# Patient Record
Sex: MALE | Race: WHITE | NOT HISPANIC OR LATINO | Employment: FULL TIME | URBAN - NONMETROPOLITAN AREA
[De-identification: names, ages, dates, MRNs, and addresses within clinical notes are randomized per-mention and may not be internally consistent; named-entity substitution may affect disease eponyms.]

---

## 2017-07-07 ENCOUNTER — OFFICE VISIT - GICH (OUTPATIENT)
Dept: FAMILY MEDICINE | Facility: OTHER | Age: 52
End: 2017-07-07

## 2017-07-07 ENCOUNTER — HISTORY (OUTPATIENT)
Dept: FAMILY MEDICINE | Facility: OTHER | Age: 52
End: 2017-07-07

## 2017-07-07 DIAGNOSIS — A69.20 LYME DISEASE: ICD-10-CM

## 2017-07-25 ENCOUNTER — COMMUNICATION - GICH (OUTPATIENT)
Dept: INTERNAL MEDICINE | Facility: OTHER | Age: 52
End: 2017-07-25

## 2017-07-28 ENCOUNTER — HISTORY (OUTPATIENT)
Dept: INTERNAL MEDICINE | Facility: OTHER | Age: 52
End: 2017-07-28

## 2017-07-28 ENCOUNTER — OFFICE VISIT - GICH (OUTPATIENT)
Dept: INTERNAL MEDICINE | Facility: OTHER | Age: 52
End: 2017-07-28

## 2017-07-28 DIAGNOSIS — R53.81 OTHER MALAISE: ICD-10-CM

## 2017-07-28 DIAGNOSIS — R53.83 OTHER FATIGUE: ICD-10-CM

## 2017-07-28 DIAGNOSIS — F41.8 OTHER SPECIFIED ANXIETY DISORDERS: ICD-10-CM

## 2017-07-28 ASSESSMENT — ANXIETY QUESTIONNAIRES
1. FEELING NERVOUS, ANXIOUS, OR ON EDGE: NOT AT ALL
3. WORRYING TOO MUCH ABOUT DIFFERENT THINGS: NOT AT ALL
6. BECOMING EASILY ANNOYED OR IRRITABLE: NOT AT ALL
2. NOT BEING ABLE TO STOP OR CONTROL WORRYING: NOT AT ALL
4. TROUBLE RELAXING: NOT AT ALL
7. FEELING AFRAID AS IF SOMETHING AWFUL MIGHT HAPPEN: NOT AT ALL
5. BEING SO RESTLESS THAT IT IS HARD TO SIT STILL: NOT AT ALL
GAD7 TOTAL SCORE: 0

## 2017-08-03 ENCOUNTER — COMMUNICATION - GICH (OUTPATIENT)
Dept: INTERNAL MEDICINE | Facility: OTHER | Age: 52
End: 2017-08-03

## 2017-08-03 DIAGNOSIS — F41.8 OTHER SPECIFIED ANXIETY DISORDERS: ICD-10-CM

## 2017-09-11 ENCOUNTER — COMMUNICATION - GICH (OUTPATIENT)
Dept: INTERNAL MEDICINE | Facility: OTHER | Age: 52
End: 2017-09-11

## 2017-12-28 NOTE — TELEPHONE ENCOUNTER
Patient Information     Patient Name MRN Zachariah Townsend [id number] Male 1965      Telephone Encounter by Shanika Larsen at 2017  8:56 AM     Author:  Shanika Larsen Service:  (none) Author Type:  (none)     Filed:  2017  8:57 AM Encounter Date:  2017 Status:  Signed     :  Shanika Larsen            Patient did not receive 37.5 mg Effexor ER, verbal order provided to allan.    Shanika Larsen LPN        2017 8:57 AM

## 2017-12-28 NOTE — TELEPHONE ENCOUNTER
Patient Information     Patient Name MRN Zachariah Townsend [id number] Male 1965      Telephone Encounter by Shanika Larsen at 2017 10:04 AM     Author:  Shanika Larsen Service:  (none) Author Type:  (none)     Filed:  2017 10:05 AM Encounter Date:  2017 Status:  Signed     :  Shanika Larsen            Patient placed with Dominic Galo MD on 17, around 1040.    Shanika Larsen LPN        2017 10:05 AM

## 2017-12-28 NOTE — TELEPHONE ENCOUNTER
Patient Information     Patient Name MRZachariah Allen [id number] Male 1965      Telephone Encounter by Anne Sprague RN at 8/3/2017  4:14 PM     Author:  Anne Sprague RN Service:  (none) Author Type:  NURS- Registered Nurse     Filed:  8/3/2017  4:15 PM Encounter Date:  8/3/2017 Status:  Signed     :  Anne Sprague RN (NURS- Registered Nurse)            Depression-in adults 18 and over  Serotonin/Norepinephrine Reuptake Inhibitors    Office visit in the past 12 months or as indicated in chart.  Should have clinic visit 1-2 months after initial prescription.    Last visit with RAFA KAY was on: 2017 in [facility]  Next visit with RAFA KAY is on: No future appointment listed with this provider  Next visit with Internal Medicine is on: No future appointment listed in this department  Appears RX from  was printed; rx now being sent.   Max refills 12 months from last office visit or per providers notes.  Prescription refilled per RN Medication Refill Policy.................... ANNE SPRAGUE RN ....................  8/3/2017   4:14 PM

## 2017-12-28 NOTE — TELEPHONE ENCOUNTER
Patient Information     Patient Name MRN Zachariah Townsend [id number] Male 1965      Telephone Encounter by Dominic Galo MD at 2017  5:55 PM     Author:  Dominic Galo MD Service:  (none) Author Type:  Physician     Filed:  2017  5:56 PM Encounter Date:  2017 Status:  Signed     :  Dominic Galo MD (Physician)            Noted.     It did not work to see patient on Tuesday, 2017.  -- See if patient is available for some time on Friday, 2017 -- there is a potential spot midmorning or toward the end of the day.    Dominic Galo MD

## 2017-12-28 NOTE — PROGRESS NOTES
Patient Information     Patient Name MRZachariah Allen [id number] Male 1965      Progress Notes by Dominic Galo MD at 2017 10:40 AM     Author:  Dominic Galo MD Service:  (none) Author Type:  Physician     Filed:  2017  7:35 PM Encounter Date:  2017 Status:  Signed     :  Dominic Galo MD (Physician)            Nursing Notes:   Shanika Larsen  2017 10:59 AM  Signed  Patient presents to the clinic for medication follow up.    Shanika JUAN Larsen        2017 10:48 AM    Zachariah Stout presents to clinic today for:   Chief Complaint    Patient presents with      Follow Up     HPI: Mr. Stout is a 51 y.o. male who presents today for evaluation of above.     (F41.8) Anxiety associated with depression  (primary encounter diagnosis)  (R53.81,  R53.83) Malaise and fatigue     Patient presents for follow-up of anxiety and depression, fatigue and malaise.  Was taking low-dose Effexor for quite a while did really well with this.  In the slowly tapering down 1 tablet every other day and then every third day than in the up stopping it.  States that he went back on it not too long ago because he was having more anxiety issues and it really isn't working as well as before.  He is wondering about increasing the dose versus changing medications.  Says it worked so well advised we try a dose increase he still has quite a few of the 37-1/2 mg capsules at home advised patient if the 75 mg capsule and the doing well for him for a couple months ago was try reducing the dose in the future.      With his fatigue and malaise, low previously checked a number of labs.  His B12 level came back in in the low-normal range previously.  He did try some oral B12 for a while but is not sure did a whole lot.    He also tried some vitamin D supplements, patient is not sure vitamin D tablets did much for him.  So he stopped taking them.    Treatment options and potential deficiencies reviewed.   Advised trial of B12 shot.  We talked about risk and benefits he would like to proceed.    Mr. Stout's Body mass index is 25.92 kg/(m^2). This is out of the normal range for a 51 y.o. Normal range for ages 18+ is between 18.5 and 24.9. To lose weight we reviewed risks and benefits of appropriate options such as diet, exercise, and medications. Patient's strategy will be  self-directed nutrition plan and self-directed exercise program   BP Readings from Last 1 Encounters:07/28/17 : 110/64  Mr. Hanks blood pressure is within the normal range for adults. Per JNC-8 guidelines normal adult blood pressure is < 120/80, pre-hypertensive is between 120/80 and 139/89, and hypertension is 140/90 or greater.    Functional Capacity: > 4 METS.   Reports that he can climb a flight of stairs without any chest pain/heaviness or shortness of breath.   Patient reports no current symptoms of fevers, chills, nausea/vomiting.   No cough. No shortness of breath.   No change in bowel/bladder habits. No melena, hematochezia. No Hematuria.   No rashes. No palpitations.  No orthopnea/paroxysmal nocturnal dyspnea   No vision or hearing issues.   No significant mood issues -- more anxiety again, low-dose effexor isn't working as well.   No bruising.     CHOLO:  CHOLO-7 ANXIETY SCREENING 7/28/2017   CHOLO date (doc flow) 7/28/2017   Nervous, anxious 0   Cannot stop worrying 0   Worry about different things 0   Cannot relax 0   Feeling restless 0   Easily annoyed/irritated 0   Afraid of awful event 0   Score 0   Severity none   Some recent data might be hidden        PHQ9:  PHQ Depression Screening 10/24/2016 7/28/2017   Date of PHQ exam (doc flow) 10/24/2016 7/28/2017   1. Lack of interest/pleasure 0 - Not at all 0 - Not at all   2. Feeling down/depressed 0 - Not at all 0 - Not at all   PHQ-2 TOTAL SCORE 0 0   3. Trouble sleeping 0 - Not at all 0 - Not at all   4. Decreased energy 3 - Nearly every day 0 - Not at all   5. Appetite change 0 - Not at  all 0 - Not at all   6. Feelings of failure 0 - Not at all 0 - Not at all   7. Trouble concentrating 0 - Not at all 0 - Not at all   8. Activity level 0 - Not at all 0 - Not at all   9. Hurting yourself 0 - Not at all 0 - Not at all   PHQ-9 TOTAL SCORE 3 0   PHQ-9 Severity Level none none   Functional Impairment somewhat difficult not difficult at all   Some recent data might be hidden          I have personally reviewed the past medical history, past surgical history, medications, allergies, family and social history as listed below, on 2017.    Patient Active Problem List      Diagnosis Date Noted     Mixed hyperlipidemia 2016     Malaise and fatigue 2016     Healed perforation of right ear drum 2015     Bradycardia 2015     Anxiety associated with depression 2013     Past Medical History:     Diagnosis  Date     Health care maintenance      Past Surgical History:      Procedure  Laterality Date     NO PREVIOUS SURGERY       Current Outpatient Prescriptions       Medication  Sig Dispense Refill     venlafaxine (EFFEXOR XR) 75 mg cp24 Extended-Release capsule TAKE ONE CAPSULE BY MOUTH EVERY DAY WITH MEAL 90 capsule 3     venlafaxine (EFFEXOR XR) 75 mg cp24 Extended-Release capsule Take 1 capsule by mouth once daily with a meal. - dose increase 2017 30 capsule 0     Allergies      Allergen   Reactions     Lexapro [Escitalopram]  Diaphoresis     Fatigue/malaise      No family history on file.  Family Status     Relation  Status     Father      Mother Alive    Alcohol abuse.       Social History     Social History        Marital status:  Single     Spouse name: Kate     Number of children:  2     Years of education:  N/A     Occupational History       [employer]     Social History Main Topics       Smoking status: Never Smoker     Smokeless tobacco: Never Used     Alcohol use No     Drug use: No     Sexual activity: Not on file     Other  "Topics  Concern     Not on file      Social History Narrative      - Wife Kate.     2 children - 1 son, 1 daughter.     Works for [employer].    Was an only child.          Pertinent ROS was performed and was negative as noted in HPI above.     EXAM:   Vitals:     07/28/17 1049   BP: 110/64   Pulse: 56   Weight: 84 kg (185 lb 2 oz)     BP Readings from Last 3 Encounters:    07/28/17 110/64   07/07/17 140/78   10/24/16 106/60     Wt Readings from Last 3 Encounters:    07/28/17 84 kg (185 lb 2 oz)   07/07/17 84.9 kg (187 lb 3.2 oz)   10/24/16 85.7 kg (189 lb)     Estimated body mass index is 25.92 kg/(m^2) as calculated from the following:    Height as of 7/7/17: 1.8 m (5' 10.87\").    Weight as of this encounter: 84 kg (185 lb 2 oz).     EXAM:  Constitutional: Pleasant, alert, appropriate appearance for age. No acute distress  Lymphatic Exam: Non-palpable nodes in neck, clavicular regions  Pulmonary: Lungs are clear to auscultation bilaterally, without wheezes or crackles  Cardiovascular Exam: regular rate and rhythm, no pedal edema  Gastrointestinal Exam: Soft, non-tender, non-distended, positive bowel sounds  Integument: No abnormal rashes, sores, or ulcerations noted  Neurologic Exam: CN 3-12 grossly intact   Musculoskeletal Exam: Moves upper and lower extremities symmetrically, No focal weakness  Gait and station appear grossly normal  Psychiatric Exam: Awake and Alert, Affect and mood appropriate  Speech is fluent, Thought process is normal    INVESTIGATIONS:  Results for orders placed or performed in visit on 07/05/16      COMPLETE METABOLIC PANEL      Result  Value Ref Range    SODIUM 137 132 - 142 mmol/L    POTASSIUM 4.0 3.5 - 5.1 mmol/L    CHLORIDE 104 98 - 107 mmol/L    CO2,TOTAL 28 21 - 31 mmol/L    ANION GAP 5 5 - 18                    GLUCOSE 95 70 - 105 mg/dL    CALCIUM 9.6 8.6 - 10.3 mg/dL    BUN 24 7 - 25 mg/dL    CREATININE 1.11 0.70 - 1.30 mg/dL    BUN/CREAT RATIO           22     "                GFR if African American >60 >60 ml/min/1.73m2    GFR if not African American >60 >60 ml/min/1.73m2    ALBUMIN 4.7 3.5 - 5.7 g/dL    PROTEIN,TOTAL 7.5 6.4 - 8.9 g/dL    GLOBULIN                  2.8 2.0 - 3.7 g/dL    A/G RATIO 1.7 1.0 - 2.0                    BILIRUBIN,TOTAL 0.4 0.3 - 1.0 mg/dL    ALK PHOSPHATASE 39 34 - 104 IU/L    ALT (SGPT) 15 7 - 52 IU/L    AST (SGOT) 13 13 - 39 IU/L   LIPID PANEL      Result  Value Ref Range    CHOLESTEROL,TOTAL 261 (H) <200 mg/dL    TRIGLYCERIDES 174 (H) <150 mg/dL    HDL CHOLESTEROL 51 23 - 92 mg/dL    NON-HDL CHOLESTEROL 210 (H) <145 mg/dl    CHOL/HDL RATIO            5.12 (H) <4.50                    LDL CHOLESTEROL 175 (H) <100 mg/dL    PATIENT STATUS            NOT GIVEN                   TSH      Result  Value Ref Range    TSH 3.44 0.34 - 5.60 uIU/mL   VITAMIN B12      Result  Value Ref Range    VITAMIN B12 474 180 - 914 pg/mL   VITAMIN D 25 (DEFICIENCY)      Result  Value Ref Range    VITAMIN D TOTAL AFL 27.9   ng/mL   CBC WITH AUTO DIFFERENTIAL      Result  Value Ref Range    WHITE BLOOD COUNT         7.1 4.5 - 11.0 thou/cu mm    RED BLOOD COUNT           4.71 4.30 - 5.90 mil/cu mm    HEMOGLOBIN                14.9 13.5 - 17.5 g/dL    HEMATOCRIT                42.9 37.0 - 53.0 %    MCV                       91 80 - 100 fL    MCH                       31.6 26.0 - 34.0 pg    MCHC                      34.7 32.0 - 36.0 g/dL    RDW                       10.9 (L) 11.5 - 15.5 %    PLATELET COUNT            255 140 - 440 thou/cu mm    MPV                       7.4 6.5 - 11.0 fL    NEUTROPHILS               54.9 42.0 - 72.0 %    LYMPHOCYTES               33.4 20.0 - 44.0 %    MONOCYTES                 8.8 <12.0 %    EOSINOPHILS               1.6 <8.0 %    BASOPHILS                 1.2 <3.0 %    ABSOLUTE NEUTROPHILS      3.9 1.7 - 7.0 thou/cu mm    ABSOLUTE LYMPHOCYTES      2.4 0.9 - 2.9 thou/cu mm    ABSOLUTE MONOCYTES        0.6 <0.9 thou/cu mm    ABSOLUTE  EOSINOPHILS      0.1 <0.5 thou/cu mm    ABSOLUTE BASOPHILS        0.1 <0.3 thou/cu mm       ASSESSMENT AND PLAN:  Zachariah was seen today for follow up.    Diagnoses and all orders for this visit:    Anxiety associated with depression  -     venlafaxine (EFFEXOR XR) 75 mg cp24 Extended-Release capsule; TAKE ONE CAPSULE BY MOUTH EVERY DAY WITH MEAL  -     venlafaxine (EFFEXOR XR) 75 mg cp24 Extended-Release capsule; Take 1 capsule by mouth once daily with a meal. - dose increase 7/28/2017    Malaise and fatigue  -     cyanocobalamin 1,000 mcg injection (VITAMIN B12); Inject 1 mL intramuscular one time.    lab results and schedule of future lab studies reviewed with patient, reviewed diet, exercise and weight control, recommended sodium restriction    -- Expected clinical course discussed   -- Medications and their side effects discussed    The 10-year ASCVD risk score (Guzman LEIJA Jr, et al., 2013) is: 3.5%    Values used to calculate the score:      Age: 51 years      Sex: Male      Is Non- : No      Diabetic: No      Tobacco smoker: No      Systolic Blood Pressure: 110 mmHg      Is BP treated: No      HDL Cholesterol: 51 mg/dL      Total Cholesterol: 261 mg/dL    Zachariah is also recommended to eat a heart-healthy diet, do regular aerobic exercises, maintain a desirable body weight, and avoid tobacco products. These recommendations are from the American Heart Association (AHA) which stresses the importance of lifestyle changes to lower cardiovascular disease risk.     Return in about 1 year (around 7/28/2018) for -- annual physical.    Patient Instructions   1. Anxiety associated with depression    Dose increase Effexor....    - venlafaxine (EFFEXOR XR) 75 mg cp24 Extended-Release capsule; TAKE ONE CAPSULE BY MOUTH EVERY DAY WITH MEAL  Dispense: 90 capsule; Refill: 3  - venlafaxine (EFFEXOR XR) 75 mg cp24 Extended-Release capsule; Take 1 capsule by mouth once daily with a meal. - dose increase 7/28/2017   Dispense: 30 capsule; Refill: 0    2. Malaise and fatigue    - cyanocobalamin 1,000 mcg injection (VITAMIN B12); Inject 1 mL intramuscular one time.    Vitamin B12 deficiency:    Vitamin B12 deficiency can cause numerous symptoms.  Fatigue and malaise, low energy levels, low blood counts or anemia, poor mood or depression, neuropathy or pins and needles/burning sensation of the hands and feet.    -- trial of B12 shot today.    -- If B12 shot improves your energy and your blood counts, we can do B12 shots every 3-4 weeks up to every 2 or 3 months if needed.    -- Omeprazole (Proton Pump Inhibitors in general) and metformin can lower B12 levels (inhibits absorption).    -- Consider B12 tablet or B-complex tablet -- once daily.     -- Some people are able to take and absorb oral B12 or B complex tablets.   -- Some people are NOT able to absorb oral B12 very well.    If you decide to proceed with oral B12 replacement, but your B12 levels do not improve, you likely will need to use B12 shots instead.    Return in approximately 1 year, or sooner as needed for follow-up with Dr. Galo.  -- annual physical    Clinic : [phone]  Appointment line: [phone]      Dominic Galo MD

## 2017-12-28 NOTE — PATIENT INSTRUCTIONS
Patient Information     Patient Name MRZachariah Allen [id number] Male 1965      Patient Instructions by Zarina Gonzales NP at 2017  2:45 PM     Author:  Zarina Gonzales NP Service:  (none) Author Type:  PHYS- Nurse Practitioner     Filed:  2017  2:40 PM Encounter Date:  2017 Status:  Signed     :  Zarina Gonzales NP (PHYS- Nurse Practitioner)            Lyme Disease   ________________________________________________________________________  KEY POINTS    Lyme disease is an infection caused by the bite of a blacklegged tick (also called deer tick) that is infected with the bacteria.    Lyme disease is treated with antibiotics. In most cases, the symptoms go away a few weeks or months after antibiotic treatment, but sometimes the symptoms last several years.    Follow the full course of treatment prescribed by your healthcare provider. Do not stop taking antibiotics because you start to feel better or your symptoms go away.    Wear long-sleeved shirts and long pants and use insect repellant to avoid ticks. If you find a tick attached to your body, you need to remove it.  ________________________________________________________________________  What is Lyme disease?  Lyme disease is an infection caused by the bite of a blacklegged tick (also called deer tick) infected with bacteria. The tick is so small that you may not notice the tick or its bite. Most deer ticks do not carry Lyme disease. Even if a tick is infected, it may not transfer the disease to you. An infected tick that is attached for less than 36 hours is less likely to cause Lyme disease.  If the disease is not diagnosed and treated, the symptoms can last for several years, but they usually get better over time.  What is the cause?  Ticks are found in woods, forests, grasslands, and marshlands and at the seashore. Wild birds and animals, as well as domestic animals and pets such as dogs, horses, and cows, can  carry ticks. Ticks may climb on humans from animals, leaves, or low-lying brush. Ticks cannot jump or fly.  People usually become infected during the summer when they are more likely to be exposed to ticks. Hikers, campers, hunters, and people living in wooded or rural areas have a higher risk for Lyme disease. In the , the infection is more common in the northern states.  What are the symptoms?  Lyme disease is hard to diagnose because its symptoms can vary greatly from person to person. The first symptoms may not even be noticed. Symptoms may come and go in cycles lasting a week or longer.  Untreated Lyme disease may progress through these 3 stages:  Stage 1:  In the first month after a bite by an infected tick, you may get a rash at the site of your bite. The rash begins as a large red spot that may be flat or bumpy. The area of the rash feels warm, but it is not painful or itchy. The rash slowly spreads and the red color at the center of the rash may fade, creating what is called a bull's-eye rash. Sometimes the rash may blister or scab in the center. The thigh, groin, and armpit are common sites for the rash, but it can appear anywhere.  Although most infected people develop a rash, you may not have this symptom, or you may overlook it.  You may feel like you have the flu, with symptoms such as:    Feeling very tired or drowsy    Pain or stiffness in muscles and joints    Headache or jaw pain    Chills and fever    Stiff neck  Less common symptoms of early Lyme disease are:    Red, irritated eyes    Sore throat and cough    In men, swelling of the testicles  Even if you don't get treatment, the early symptoms usually improve or go away within several weeks. However, you may feel tired, drowsy, and have muscle or joint pain for months after the rash has gone.  Stage 2:  If not treated, Lyme disease can spread to your brain, heart, and joints. Several weeks to months after the first symptoms appear, you may  develop:    Weakness or paralysis of one or both sides of your face    Fever, headache, and a stiff neck    Heart problems, such as an irregular heartbeat    Confusion    Seizures    Coma  During this second stage, you may have pain in your joints, tendons, muscles, or bones, usually without joint swelling. These symptoms usually go away within a few weeks.  Stage 3:  After several months of infection, you may have:    Joint pain and swelling    Numbness or tingling in your hands and feet    Trouble concentrating    Weakness in your arms or legs    Depression  How is it diagnosed?  Your healthcare provider will ask about your symptoms and medical history and examine you. You may have a blood test for Lyme disease. Or you and your provider may decide to start treatment without the test or before the test results are available.  If you were recently bitten by a tick, saving the tick in a marked plastic bag in your freezer may help your provider diagnose your symptoms and decide on treatment.  How is it treated?  Lyme disease is treated with antibiotics. In most cases, the symptoms go away a few weeks or months after antibiotic treatment, but sometimes the symptoms last several years.   If you are in stages 2 or 3 of the disease, you may need other treatments if you have symptoms that affect your heart, nervous system, or joints.  If you are pregnant or nursing and have Lyme disease, you may pass the disease to your baby. Although this happens rarely, you should call your healthcare provider right away if you are pregnant or nursing and are bitten by a tick or have symptoms of Lyme disease.  How can I take care of myself?  Follow the full course of treatment prescribed by your healthcare provider. You need to take all of your antibiotic medicine. Do not stop taking antibiotics because you start to feel better or your symptoms go away. Ask your healthcare provider:    How long it will take to recover    If there are  activities you should avoid and when you can return to your normal activities    How to take care of yourself at home    What symptoms or problems you should watch for and what to do if you have them  Make sure you know when you should come back for a checkup.  How can I help prevent Lyme disease?    In areas of thick underbrush, try to stay near the center of trails.    When you are outdoors, wear long-sleeved shirts tucked into your pants. Wear your pants tucked into your socks or boot tops if possible. A hat may help, too. Wearing light-colored clothing may make it easier to spot a small tick before it reaches your skin and bites. While you are outside, check for ticks every 4 hours and remove any ticks on clothing or exposed skin.    Use approved tick repellents on exposed skin and clothing. Don t use more than recommended in the package directions. Do not put repellent on open wounds or rashes. When using sprays, don t spray the repellent directly on your face. Spray the repellent on your hands first and then put it on your face, but not near your eyes or mouth. Then wash the spray off your hands.    Adults should use repellent products with no more than 35% DEET. Children older than 2 months can use repellents with no more than 30% DEET. Don t put DEET on a child s hand or other body part they are likely to put in their mouth. DEET should be applied just once a day. Wash it off your body when you go back indoors. Some products contain more than 35% DEET. The higher concentrations are no more effective than the lower concentrations, but they may last longer. Read the label carefully before applying.    Picaridin may irritate the skin less than DEET and appears to be just as effective.    Spray clothes with repellents because ticks may crawl from clothing to the skin. Products containing permethrin are recommended for use on clothing, shoes, bed nets, and camping gear. Permethrin-treated clothing repels and  kills ticks, mosquitoes, and other insects and can keep working after laundering. Permethrin should be reapplied to clothing according to the instructions on the product label. You can buy clothing and hats pretreated with permethrin. Permethrin does not work as a repellent when it is put on the skin.    Treat household pets for ticks and fleas. Check pets after they've been outdoors.    Brush off clothing and pets before entering the house.    After you have been outdoors, undress and check your body for ticks. They usually crawl around for several hours before biting. Check your clothes, too. Wash them right away to remove any ticks.    If you find a tick attached to your body, you need to remove it.    Grasp the tick with tweezers or fingers (covered with gloves or a tissue) as close to the skin as possible. Gently pull the tick straight away from you until it releases its hold. Use a slow gentle pulling motion. Pulling the tick out too quickly may tear the body from the mouth, leaving the mouth still in the skin. If you are unable to remove the tick completely, you may need to see your healthcare provider. Do not twist the tick as you pull, and try not to squeeze its body.    After you have removed the tick, thoroughly wash your hands and the bite area with soap and water. Put an antiseptic such as rubbing alcohol on the area where you were bitten.    Put the tick in a sealed plastic bag and keep it in the freezer. Identification of the tick may help your provider diagnose and treat any symptoms. If you do not have any symptoms of disease after 1 month, you can throw away the tick.    Shower and shampoo after your outing.    Inspect any gear you have carried outdoors.    If you spend much time hiking, you may want to include a pair of tick tweezers in your first-aid kit. You can buy them at sporting goods stores.    If you had a shot against Lyme disease (shots were available until 2002) you are probably no  longer protected because the vaccine loses its effect over time. A new vaccine may be approved by the FDA in 2015. There is no vaccine available for Lyme disease for humans at this time.

## 2017-12-28 NOTE — TELEPHONE ENCOUNTER
Patient Information     Patient Name MRZachariah Allen [id number] Male 1965      Telephone Encounter by Aurora Dhaliwal at 2017 10:10 AM     Author:  Aurora Dhaliwal Service:  (none) Author Type:  (none)     Filed:  2017 10:14 AM Encounter Date:  2017 Status:  Signed     :  Aurora Dhaliwal            Patient would like to increase the dosage of his venlafaxine. He states in the last month or two it has not been working like it did before. He has not been in for a follow up on this medication since 10/24/16.  Aurora SALGADO, RAEANN.......2017..10:13 AM

## 2017-12-28 NOTE — TELEPHONE ENCOUNTER
Patient Information     Patient Name MRN Zachariah Townsend [id number] Male 1965      Telephone Encounter by Aurora Dhaliwal at 2017  3:49 PM     Author:  Aurora Dhaliwal Service:  (none) Author Type:  (none)     Filed:  2017  3:50 PM Encounter Date:  2017 Status:  Signed     :  Aurora Dhaliwal            Left message to call back as Dr. Galo just said he can be seen right now if he can make it to the clinic as soon as possible.  Aurora Dhaliwal...2017..3:49 PM

## 2017-12-28 NOTE — PATIENT INSTRUCTIONS
Patient Information     Patient Name MRZachariah Allen [id number] Male 1965      Patient Instructions by Dominic Galo MD at 2017 10:40 AM     Author:  Dominic Galo MD  Service:  (none) Author Type:  Physician     Filed:  2017 11:07 AM  Encounter Date:  2017 Status:  Addendum     :  Dominic Galo MD (Physician)        Related Notes: Original Note by Dominic Galo MD (Physician) filed at 2017 11:07 AM            1. Anxiety associated with depression    Dose increase Effexor....    - venlafaxine (EFFEXOR XR) 75 mg cp24 Extended-Release capsule; TAKE ONE CAPSULE BY MOUTH EVERY DAY WITH MEAL  Dispense: 90 capsule; Refill: 3  - venlafaxine (EFFEXOR XR) 75 mg cp24 Extended-Release capsule; Take 1 capsule by mouth once daily with a meal. - dose increase 2017  Dispense: 30 capsule; Refill: 0    2. Malaise and fatigue    - cyanocobalamin 1,000 mcg injection (VITAMIN B12); Inject 1 mL intramuscular one time.    Vitamin B12 deficiency:    Vitamin B12 deficiency can cause numerous symptoms.  Fatigue and malaise, low energy levels, low blood counts or anemia, poor mood or depression, neuropathy or pins and needles/burning sensation of the hands and feet.    -- trial of B12 shot today.    -- If B12 shot improves your energy and your blood counts, we can do B12 shots every 3-4 weeks up to every 2 or 3 months if needed.    -- Omeprazole (Proton Pump Inhibitors in general) and metformin can lower B12 levels (inhibits absorption).    -- Consider B12 tablet or B-complex tablet -- once daily.     -- Some people are able to take and absorb oral B12 or B complex tablets.   -- Some people are NOT able to absorb oral B12 very well.    If you decide to proceed with oral B12 replacement, but your B12 levels do not improve, you likely will need to use B12 shots instead.    Return in approximately 1 year, or sooner as needed for follow-up with Dr. Galo.  -- annual  physical    Clinic : [phone]  Appointment line: [phone]

## 2017-12-28 NOTE — PROGRESS NOTES
"Patient Information     Patient Name MRN Sex Zachariah Real [id number] Male 1965      Progress Notes by Zarina Gonzales NP at 2017  2:45 PM     Author:  Zarina Gonzales NP Service:  (none) Author Type:  PHYS- Nurse Practitioner     Filed:  2017  8:31 PM Encounter Date:  2017 Status:  Signed     :  Zarina Gonzales NP (PHYS- Nurse Practitioner)            Nursing Notes:   Trinidad Macario  2017  2:37 PM  Signed  Patient presents with bug bite 1 week ago. Patient states rash on right abdomen and it is growing. Patient states it itches and burns. No fevers present. Trinidad Macario LPN .............2017  2:29 PM    SUBJECTIVE:    Zachariah Stout is a 51 y.o. male who presents for rash on abd.     HPI  Zachariah Stout is here with a rash on the abd. Noted it a week ago and now has grown. Mild irritation and itchiness. No known tick bites, but did feel it started with a bug bite. No fevers, chills, no body aches. Has not used OTC on it.     Current Outpatient Prescriptions on File Prior to Visit       Medication  Sig Dispense Refill     venlafaxine (EFFEXOR XR) 37.5 mg Extended-Release capsule TAKE ONE CAPSULE BY MOUTH EVERY DAY WITH MEAL 90 capsule 1     No current facility-administered medications on file prior to visit.        REVIEW OF SYSTEMS:  Review of Systems   Constitutional: Negative.    HENT: Negative.    Eyes: Negative.    Respiratory: Negative.    Cardiovascular: Negative.    Gastrointestinal: Negative.    Genitourinary: Negative.    Skin: Positive for rash.       OBJECTIVE:  /78  Pulse 62  Temp 98  F (36.7  C) (Tympanic)  Ht 1.8 m (5' 10.87\")  Wt 84.9 kg (187 lb 3.2 oz)  BMI 26.21 kg/m2    EXAM:   Physical Exam   Constitutional: He is well-developed, well-nourished, and in no distress.   HENT:   Head: Normocephalic and atraumatic.   Eyes: Conjunctivae are normal.   Neck: Neck supple.   Cardiovascular: Normal rate.    Pulmonary/Chest: Effort normal. " No respiratory distress.   Lymphadenopathy:     He has no cervical adenopathy.   Skin: Skin is warm and dry.   Skin: rash location: RT side of abdomen  rash description: features of Lyme's disease: central papule surrounded by annual erythema that measures 10+ cm of which the advancing border is slightly raised, warm, red  Complications include none.   Psychiatric: Mood and affect normal.   Nursing note and vitals reviewed.      ASSESSMENT/PLAN:    ICD-10-CM    1. Erythema migrans (Lyme disease) A69.20 doxycycline (VIBRAMYCIN) 100 mg capsule        Plan:  Highly suspicious of EM. He does not remember removing a tick, however knows it started with a red papule/bug bite. F/U if needed Doxy rx sent in. I explained my diagnostic considerations and recommendations to the patient, who voiced understanding and agreement with the treatment plan. All questions were answered. We discussed potential side effects of any prescribed or recommended therapies, as well as expectations for response to treatments. He was advised to contact our office if there is no improvement or worsening of conditions or symptoms.  If s/s worsen or persist, patient will either come back or follow up with PCP.       MAGAN ANDERSEN NP ....................  7/7/2017   8:30 PM

## 2017-12-30 NOTE — NURSING NOTE
Patient Information     Patient Name MRZachariah Allen [id number] Male 1965      Nursing Note by Shanika Larsen at 2017 10:40 AM     Author:  Shanika Larsen Service:  (none) Author Type:  (none)     Filed:  2017 10:59 AM Encounter Date:  2017 Status:  Signed     :  Shanika Larsen            Patient presents to the clinic for medication follow up.    Shanika Larsen LPN        2017 10:48 AM

## 2017-12-30 NOTE — NURSING NOTE
Patient Information     Patient Name MRN Zachariah Townsend [id number] Male 1965      Nursing Note by Trinidad Macario at 2017  2:45 PM     Author:  Trinidad Macario Service:  (none) Author Type:  NURS- Student Practical Nurse     Filed:  2017  2:37 PM Encounter Date:  2017 Status:  Signed     :  Trinidad Macario (NURS- Student Practical Nurse)            Patient presents with bug bite 1 week ago. Patient states rash on right abdomen and it is growing. Patient states it itches and burns. No fevers present. Trinidad Macario LPN .............2017  2:29 PM

## 2018-01-26 VITALS
BODY MASS INDEX: 25.92 KG/M2 | DIASTOLIC BLOOD PRESSURE: 64 MMHG | SYSTOLIC BLOOD PRESSURE: 110 MMHG | WEIGHT: 185.13 LBS | HEART RATE: 56 BPM

## 2018-01-26 VITALS
HEART RATE: 62 BPM | TEMPERATURE: 98 F | WEIGHT: 187.2 LBS | DIASTOLIC BLOOD PRESSURE: 78 MMHG | SYSTOLIC BLOOD PRESSURE: 140 MMHG | BODY MASS INDEX: 26.21 KG/M2 | HEIGHT: 71 IN

## 2018-01-29 ENCOUNTER — DOCUMENTATION ONLY (OUTPATIENT)
Dept: FAMILY MEDICINE | Facility: OTHER | Age: 53
End: 2018-01-29

## 2018-01-29 RX ORDER — VENLAFAXINE HYDROCHLORIDE 75 MG/1
75 CAPSULE, EXTENDED RELEASE ORAL
COMMUNITY
Start: 2017-07-28 | End: 2018-09-10 | Stop reason: DRUGHIGH

## 2018-01-29 RX ORDER — VENLAFAXINE HYDROCHLORIDE 37.5 MG/1
1 CAPSULE, EXTENDED RELEASE ORAL
COMMUNITY
Start: 2017-08-03 | End: 2018-07-10

## 2018-02-01 ASSESSMENT — PATIENT HEALTH QUESTIONNAIRE - PHQ9: SUM OF ALL RESPONSES TO PHQ QUESTIONS 1-9: 0

## 2018-02-01 ASSESSMENT — ANXIETY QUESTIONNAIRES: GAD7 TOTAL SCORE: 0

## 2018-07-10 DIAGNOSIS — F41.8 ANXIETY ASSOCIATED WITH DEPRESSION: Primary | ICD-10-CM

## 2018-07-10 NOTE — LETTER
July 13, 2018      Zachariah Stout  [id number]  [facility] [address]      A refill request was received from your pharmacy for Effexor.    Additional refills require an office visit with Dr. Galo for annual review after 7/28/17.    Please call [phone] to schedule appointment.        Sincerely,      Refill Nurse

## 2018-07-13 NOTE — TELEPHONE ENCOUNTER
Routing refill request to provider for review/approval because:  Labs not current:  Creatinine    LOV: 7/28/17    Patient has both Effexor 37.5 mg and 75 mg noted on current medication list.    Left message for patient to return call to verify what he is taking.    Letter sent to patient as reminder ,will be due for annual review after 7/28/17    Unable to contact patient to verify, will route to Dominic Galo for review and consideration.    Megan Walters RN on 7/13/2018 at 11:46 AM

## 2018-07-17 RX ORDER — VENLAFAXINE HYDROCHLORIDE 37.5 MG/1
CAPSULE, EXTENDED RELEASE ORAL
Qty: 90 CAPSULE | Refills: 3 | Status: SHIPPED | OUTPATIENT
Start: 2018-07-17 | End: 2018-09-10

## 2018-08-31 DIAGNOSIS — F41.8 ANXIETY ASSOCIATED WITH DEPRESSION: ICD-10-CM

## 2018-09-05 RX ORDER — VENLAFAXINE HYDROCHLORIDE 37.5 MG/1
CAPSULE, EXTENDED RELEASE ORAL
Qty: 90 CAPSULE | Refills: 0 | OUTPATIENT
Start: 2018-09-05

## 2018-09-10 ENCOUNTER — OFFICE VISIT (OUTPATIENT)
Dept: INTERNAL MEDICINE | Facility: OTHER | Age: 53
End: 2018-09-10
Attending: INTERNAL MEDICINE
Payer: COMMERCIAL

## 2018-09-10 VITALS
WEIGHT: 193.5 LBS | HEART RATE: 56 BPM | SYSTOLIC BLOOD PRESSURE: 120 MMHG | BODY MASS INDEX: 27.09 KG/M2 | DIASTOLIC BLOOD PRESSURE: 74 MMHG

## 2018-09-10 DIAGNOSIS — M25.50 MULTIPLE JOINT PAIN: ICD-10-CM

## 2018-09-10 DIAGNOSIS — Z13.220 LIPID SCREENING: ICD-10-CM

## 2018-09-10 DIAGNOSIS — E55.9 VITAMIN D DEFICIENCY: ICD-10-CM

## 2018-09-10 DIAGNOSIS — H53.9 VISION CHANGES: ICD-10-CM

## 2018-09-10 DIAGNOSIS — F41.8 ANXIETY ASSOCIATED WITH DEPRESSION: Primary | ICD-10-CM

## 2018-09-10 DIAGNOSIS — Z00.00 ROUTINE GENERAL MEDICAL EXAMINATION AT A HEALTH CARE FACILITY: ICD-10-CM

## 2018-09-10 DIAGNOSIS — Z12.5 SPECIAL SCREENING FOR MALIGNANT NEOPLASM OF PROSTATE: ICD-10-CM

## 2018-09-10 LAB
ALBUMIN SERPL-MCNC: 4.7 G/DL (ref 3.5–5.7)
ALP SERPL-CCNC: 34 U/L (ref 34–104)
ALT SERPL W P-5'-P-CCNC: 14 U/L (ref 7–52)
ANION GAP SERPL CALCULATED.3IONS-SCNC: 6 MMOL/L (ref 3–14)
AST SERPL W P-5'-P-CCNC: 18 U/L (ref 13–39)
BASOPHILS # BLD AUTO: 0.1 10E9/L (ref 0–0.2)
BASOPHILS NFR BLD AUTO: 1.1 %
BILIRUB SERPL-MCNC: 0.5 MG/DL (ref 0.3–1)
BUN SERPL-MCNC: 20 MG/DL (ref 7–25)
CALCIUM SERPL-MCNC: 9.8 MG/DL (ref 8.6–10.3)
CHLORIDE SERPL-SCNC: 104 MMOL/L (ref 98–107)
CHOLEST SERPL-MCNC: 258 MG/DL
CO2 SERPL-SCNC: 29 MMOL/L (ref 21–31)
CREAT SERPL-MCNC: 1.06 MG/DL (ref 0.7–1.3)
DIFFERENTIAL METHOD BLD: ABNORMAL
EOSINOPHIL # BLD AUTO: 0.1 10E9/L (ref 0–0.7)
EOSINOPHIL NFR BLD AUTO: 2.3 %
ERYTHROCYTE [DISTWIDTH] IN BLOOD BY AUTOMATED COUNT: 12.3 % (ref 10–15)
GFR SERPL CREATININE-BSD FRML MDRD: 74 ML/MIN/1.7M2
GLUCOSE SERPL-MCNC: 73 MG/DL (ref 70–105)
HBA1C MFR BLD: 5.3 % (ref 4–6)
HCT VFR BLD AUTO: 39.6 % (ref 40–53)
HDLC SERPL-MCNC: 51 MG/DL (ref 23–92)
HGB BLD-MCNC: 13.9 G/DL (ref 13.3–17.7)
IMM GRANULOCYTES # BLD: 0 10E9/L (ref 0–0.4)
IMM GRANULOCYTES NFR BLD: 0.4 %
LDLC SERPL CALC-MCNC: 193 MG/DL
LYMPHOCYTES # BLD AUTO: 2.1 10E9/L (ref 0.8–5.3)
LYMPHOCYTES NFR BLD AUTO: 38.8 %
MCH RBC QN AUTO: 32.1 PG (ref 26.5–33)
MCHC RBC AUTO-ENTMCNC: 35.1 G/DL (ref 31.5–36.5)
MCV RBC AUTO: 92 FL (ref 78–100)
MONOCYTES # BLD AUTO: 0.5 10E9/L (ref 0–1.3)
MONOCYTES NFR BLD AUTO: 8.9 %
NEUTROPHILS # BLD AUTO: 2.6 10E9/L (ref 1.6–8.3)
NEUTROPHILS NFR BLD AUTO: 48.5 %
NONHDLC SERPL-MCNC: 207 MG/DL
PLATELET # BLD AUTO: 250 10E9/L (ref 150–450)
POTASSIUM SERPL-SCNC: 3.8 MMOL/L (ref 3.5–5.1)
PROT SERPL-MCNC: 7.3 G/DL (ref 6.4–8.9)
PSA SERPL-ACNC: 1.09 NG/ML
RBC # BLD AUTO: 4.33 10E12/L (ref 4.4–5.9)
SODIUM SERPL-SCNC: 139 MMOL/L (ref 134–144)
TRIGL SERPL-MCNC: 68 MG/DL
WBC # BLD AUTO: 5.3 10E9/L (ref 4–11)

## 2018-09-10 PROCEDURE — 80053 COMPREHEN METABOLIC PANEL: CPT | Performed by: INTERNAL MEDICINE

## 2018-09-10 PROCEDURE — 36415 COLL VENOUS BLD VENIPUNCTURE: CPT | Performed by: INTERNAL MEDICINE

## 2018-09-10 PROCEDURE — 83036 HEMOGLOBIN GLYCOSYLATED A1C: CPT | Performed by: INTERNAL MEDICINE

## 2018-09-10 PROCEDURE — 85025 COMPLETE CBC W/AUTO DIFF WBC: CPT | Performed by: INTERNAL MEDICINE

## 2018-09-10 PROCEDURE — 80061 LIPID PANEL: CPT | Performed by: INTERNAL MEDICINE

## 2018-09-10 PROCEDURE — 99396 PREV VISIT EST AGE 40-64: CPT | Performed by: INTERNAL MEDICINE

## 2018-09-10 PROCEDURE — G0103 PSA SCREENING: HCPCS | Performed by: INTERNAL MEDICINE

## 2018-09-10 RX ORDER — MULTIPLE VITAMINS W/ MINERALS TAB 9MG-400MCG
1 TAB ORAL DAILY
Qty: 100 TABLET | Refills: 3 | COMMUNITY
Start: 2018-09-10

## 2018-09-10 RX ORDER — VENLAFAXINE HYDROCHLORIDE 37.5 MG/1
37.5 CAPSULE, EXTENDED RELEASE ORAL DAILY
Qty: 90 CAPSULE | Refills: 3 | Status: SHIPPED | OUTPATIENT
Start: 2018-09-10 | End: 2019-10-04

## 2018-09-10 ASSESSMENT — ENCOUNTER SYMPTOMS
WHEEZING: 0
PALPITATIONS: 0
CHILLS: 0
BRUISES/BLEEDS EASILY: 0
HEMATURIA: 0
DIARRHEA: 0
VOMITING: 0
FATIGUE: 0
AGITATION: 0
MYALGIAS: 0
LIGHT-HEADEDNESS: 0
ARTHRALGIAS: 0
CONFUSION: 0
NAUSEA: 0
DYSURIA: 0
EYE PAIN: 0
SHORTNESS OF BREATH: 0
COUGH: 0
FEVER: 0
DIZZINESS: 0
ABDOMINAL PAIN: 0

## 2018-09-10 ASSESSMENT — PAIN SCALES - GENERAL: PAINLEVEL: NO PAIN (0)

## 2018-09-10 ASSESSMENT — ANXIETY QUESTIONNAIRES
1. FEELING NERVOUS, ANXIOUS, OR ON EDGE: NOT AT ALL
6. BECOMING EASILY ANNOYED OR IRRITABLE: NOT AT ALL
3. WORRYING TOO MUCH ABOUT DIFFERENT THINGS: NOT AT ALL
2. NOT BEING ABLE TO STOP OR CONTROL WORRYING: NOT AT ALL
7. FEELING AFRAID AS IF SOMETHING AWFUL MIGHT HAPPEN: NOT AT ALL
5. BEING SO RESTLESS THAT IT IS HARD TO SIT STILL: NOT AT ALL
GAD7 TOTAL SCORE: 0
IF YOU CHECKED OFF ANY PROBLEMS ON THIS QUESTIONNAIRE, HOW DIFFICULT HAVE THESE PROBLEMS MADE IT FOR YOU TO DO YOUR WORK, TAKE CARE OF THINGS AT HOME, OR GET ALONG WITH OTHER PEOPLE: NOT DIFFICULT AT ALL

## 2018-09-10 ASSESSMENT — PATIENT HEALTH QUESTIONNAIRE - PHQ9: 5. POOR APPETITE OR OVEREATING: NOT AT ALL

## 2018-09-10 NOTE — NURSING NOTE
"Patient presents to the clinic for medication management, prescription updated today.    Chief Complaint   Patient presents with     Recheck Medication       Initial There were no vitals taken for this visit. Estimated body mass index is 25.92 kg/(m^2) as calculated from the following:    Height as of 7/7/17: 5' 10.87\" (1.8 m).    Weight as of 7/28/17: 185 lb 2 oz (84 kg).  Medication Reconciliation: complete    Shanika Larsen LPN    "

## 2018-09-10 NOTE — MR AVS SNAPSHOT
After Visit Summary   9/10/2018    Zachariah Stout    MRN: [id number]           Patient Information     Date Of Birth          1965        Visit Information        Provider Department      9/10/2018 9:20 AM Dominic Galo MD [facility]        Today's Diagnoses     Anxiety associated with depression    -  1    Routine general medical examination at a health care facility        Vitamin D deficiency        Special screening for malignant neoplasm of prostate        Vision changes        Lipid screening          Care Instructions    Blood pressures are well controlled.     Effexor refilled.     Screening labs today.     Colonoscopy ordered  - they will call with date/time of appointment.      Return in approximately 1 year, or sooner as needed for follow-up with Dr. Galo.    Clinic : [phone]  Appointment line: [phone]            Follow-ups after your visit        Additional Services     GASTROENTEROLOGY ADULT REF PROCEDURE ONLY       Last Lab Result: Creatinine (mg/dL)       Date                     Value                 07/05/2016               1.11             ----------  Body mass index is 27.09 kg/(m^2).     Needed:  No  Language:  English    Patient will be contacted to schedule procedure.     Please be aware that coverage of these services is subject to the terms and limitations of your health insurance plan.  Call member services at your health plan with any benefit or coverage questions.  Any procedures must be performed at a [facility] facility OR coordinated by your clinic's referral office.    Please bring the following with you to your appointment:    (1) Any X-Rays, CTs or MRIs which have been performed.  Contact the facility where they were done to arrange for  prior to your scheduled appointment.    (2) List of current medications   (3) This referral request   (4) Any documents/labs given to you for this referral                   Future tests that were ordered for you today     Open Future Orders        Priority Expected Expires Ordered    CBC and Differential Routine  9/10/2019 9/10/2018    Comprehensive metabolic panel Routine  9/10/2019 9/10/2018    Hemoglobin A1c Routine  9/10/2019 9/10/2018    PSA Screen GH Routine  9/10/2019 9/10/2018    Lipid Panel Routine  9/10/2019 9/10/2018            Who to contact     If you have questions or need follow up information about today's clinic visit or your schedule please contact [facility] directly at [phone].  Normal or non-critical lab and imaging results will be communicated to you by MyChart, letter or phone within 4 business days after the clinic has received the results. If you do not hear from us within 7 days, please contact the clinic through MyChart or phone. If you have a critical or abnormal lab result, we will notify you by phone as soon as possible.  Submit refill requests through [facility] or call your pharmacy and they will forward the refill request to us. Please allow 3 business days for your refill to be completed.          Additional Information About Your Visit        Care EveryWhere ID     This is your Care EveryWhere ID. This could be used by other organizations to access your [facility] medical records  [id number]        Your Vitals Were     Pulse BMI (Body Mass Index)                56 27.09 kg/m2           Blood Pressure from Last 3 Encounters:   09/10/18 120/74   07/28/17 110/64   07/07/17 140/78    Weight from Last 3 Encounters:   09/10/18 193 lb 8 oz (87.8 kg)   07/28/17 185 lb 2 oz (84 kg)   07/07/17 187 lb 3.2 oz (84.9 kg)              We Performed the Following     GASTROENTEROLOGY ADULT REF PROCEDURE ONLY          Today's Medication Changes          These changes are accurate as of 9/10/18  9:53 AM.  If you have any questions, ask your nurse or doctor.               These medicines have changed or have updated prescriptions.         Dose/Directions    venlafaxine 37.5 MG 24 hr capsule   Commonly known as:  EFFEXOR-XR   This may have changed:    - See the new instructions.  - Another medication with the same name was removed. Continue taking this medication, and follow the directions you see here.   Used for:  Anxiety associated with depression   Changed by:  Dominic Galo MD        Dose:  37.5 mg   Take 1 capsule (37.5 mg) by mouth daily   Quantity:  90 capsule   Refills:  3            Where to get your medicines      These medications were sent to [id number] - [place], [place] - [address]  [address], [facility] [address]     Phone:  [phone]     venlafaxine 37.5 MG 24 hr capsule                Primary Care Provider Office Phone # Fax #    Dominic Galo [phone]       [address] [facility] [address]        Equal Access to Services     [facility] Center[facility]WAIN AH: Hadii ok gautam hadasho Soomaali, waaxda luqadaha, qaybta kaalmada adeegyada, laura mcnally . So [facility] [phone].    ATENCIÓN: Si miky vale, tiene a hoover disposición servicios gratuitos de asistencia lingüística. Llame al [phone].    We comply with applicable federal civil rights laws and [place] laws. We do not discriminate on the basis of race, color, national origin, age, disability, sex, sexual orientation, or gender identity.            Thank you!     Thank you for choosing [facility] [place]  for your care. Our goal is always to provide you with excellent care. Hearing back from our patients is one way we can continue to improve our services. Please take a few minutes to complete the written survey that you may receive in the mail after your visit with us. Thank you!             Your Updated Medication List - Protect others around you: Learn how to safely use, store and throw away your medicines at www.disposemymeds.org.          This list is accurate  as of 9/10/18  9:53 AM.  Always use your most recent med list.                   Brand Name Dispense Instructions for use Diagnosis    venlafaxine 37.5 MG 24 hr capsule    EFFEXOR-XR    90 capsule    Take 1 capsule (37.5 mg) by mouth daily    Anxiety associated with depression

## 2018-09-10 NOTE — PROGRESS NOTES
"Nursing Notes:   Shanika Larsen LPN  9/10/2018  9:39 AM  Signed  Patient presents to the clinic for medication management, prescription updated today.    Chief Complaint   Patient presents with     Recheck Medication       Initial There were no vitals taken for this visit. Estimated body mass index is 25.92 kg/(m^2) as calculated from the following:    Height as of 7/7/17: 5' 10.87\" (1.8 m).    Weight as of 7/28/17: 185 lb 2 oz (84 kg).  Medication Reconciliation: complete    Shanika Larsen LPN    Nursing note reviewed with patient.  Accurracy and completeness verified.   Mr. Stout is a 53 year old male who:  Patient presents with:  Recheck Medication    HPI     ICD-10-CM    1. Anxiety associated with depression F41.8 venlafaxine (EFFEXOR-XR) 37.5 MG 24 hr capsule     CBC and Differential     Comprehensive metabolic panel     CBC and Differential     Comprehensive metabolic panel   2. Routine general medical examination at a health care facility Z00.00 GASTROENTEROLOGY ADULT REF PROCEDURE ONLY     multivitamin, therapeutic with minerals (MULTI-VITAMIN) TABS tablet   3. Vitamin D deficiency E55.9    4. Special screening for malignant neoplasm of prostate Z12.5 PSA Screen GH     PSA Screen GH   5. Vision changes H53.9 Hemoglobin A1c     Hemoglobin A1c   6. Lipid screening Z13.220 Lipid Panel     Lipid Panel   7. Multiple joint pain M25.50      Patient presents for annual physical examination.  He is due for colonoscopy.  Orders placed.    Anxiety with depression, currently stable.  Has been weaning himself off Effexor.  Now taking 1 capsule every other day.  He is going to continue to try reducing dose.  Needs refills.  Check labs.    Vitamin D deficiency, not taking oral replacement and a multivitamin.  He declines lab rechecked today.    Screening PSA for prostate cancer.    Has had some vision changes, mostly his right eye.  Wondering about diabetes.  Last random glucose was normal.  Check A1c today.    Lipid " screening, he is not entirely fasting today.  He had a granola bar and some hot chocolate.  LDL cholesterol levels are high today.    Multiple joint pain, only intermittent.  Has no joint inflammation or swelling.  Advised possibility of a primary arthritis would be most likely.  No signs of inflammatory arthritis on exam.    Functional Capacity: > 4 METS.   Reports that he can climb a flight of stairs without any chest pain/heaviness or shortness of breath.   No orthopnea/paroxysmal nocturnal dyspnea  Review of Systems   Constitutional: Negative for chills, fatigue and fever.   HENT: Negative for congestion and hearing loss.    Eyes: Negative for pain and visual disturbance.   Respiratory: Negative for cough, shortness of breath and wheezing.    Cardiovascular: Negative for chest pain and palpitations.   Gastrointestinal: Negative for abdominal pain, diarrhea, nausea and vomiting.   Endocrine: Negative for cold intolerance and heat intolerance.   Genitourinary: Negative for dysuria and hematuria.   Musculoskeletal: Negative for arthralgias and myalgias.   Skin: Negative for pallor.   Allergic/Immunologic: Negative for immunocompromised state.   Neurological: Negative for dizziness and light-headedness.   Hematological: Does not bruise/bleed easily.   Psychiatric/Behavioral: Negative for agitation and confusion.      CHOLO:   CHOLO-7 SCORE 7/28/2017 9/10/2018   Total Score 0 0     PHQ9:  PHQ-9 SCORE 10/24/2016 7/28/2017 9/10/2018   Total Score 3 0 0       I have personally reviewed the past medical history, past surgical history, medications, allergies, family and social history as listed below, on 9/10/2018.    No Known Allergies    Current Outpatient Prescriptions   Medication Sig Dispense Refill     multivitamin, therapeutic with minerals (MULTI-VITAMIN) TABS tablet Take 1 tablet by mouth daily 100 tablet 3     venlafaxine (EFFEXOR-XR) 37.5 MG 24 hr capsule Take 1 capsule (37.5 mg) by mouth daily 90 capsule 3      [DISCONTINUED] venlafaxine (EFFEXOR-XR) 37.5 MG 24 hr capsule TAKE 1 CAPSULE BY MOUTH WITH A MEAL DAILY 90 capsule 3     [DISCONTINUED] venlafaxine (EFFEXOR-XR) 75 MG 24 hr capsule Take 75 mg by mouth daily with food Dose increase 7/28/2017          Patient Active Problem List    Diagnosis Date Noted     Vitamin D deficiency 09/10/2018     Priority: Medium     Multiple joint pain 09/10/2018     Priority: Medium     Malaise and fatigue 07/05/2016     Priority: Medium     Mixed hyperlipidemia 07/05/2016     Priority: Medium     Bradycardia 08/28/2015     Priority: Medium     Healed perforation of right ear drum 08/28/2015     Priority: Medium     Anxiety associated with depression 09/05/2013     Priority: Medium     Past Medical History:   Diagnosis Date     Encounter for general adult medical examination without abnormal findings     No Comments Provided     Past Surgical History:   Procedure Laterality Date     OTHER SURGICAL HISTORY      [id number],NO PREVIOUS SURGERY     Social History     Social History     Marital status: Single     Spouse name: Kate     Number of children: N/A     Years of education: N/A     Social History Main Topics     Smoking status: Never Smoker     Smokeless tobacco: Never Used     Alcohol use No     Drug use: No     Sexual activity: No     Other Topics Concern     None     Social History Narrative     - Wife Kate.   2 children - 1 son, 1 daughter.   Works for [employer].  Was an only child.     History reviewed. No pertinent family history.    EXAM:   Vitals:    09/10/18 0932   BP: 120/74   BP Location: Right arm   Patient Position: Sitting   Cuff Size: Adult Regular   Pulse: 56   Weight: 193 lb 8 oz (87.8 kg)       Current Pain Score: No Pain (0)     BP Readings from Last 3 Encounters:   09/10/18 120/74   07/28/17 110/64   07/07/17 140/78    Wt Readings from Last 3 Encounters:   09/10/18 193 lb 8 oz (87.8 kg)   07/28/17 185 lb 2 oz (84 kg)   07/07/17 187 lb 3.2 oz (84.9  "kg)      Estimated body mass index is 27.09 kg/(m^2) as calculated from the following:    Height as of 7/7/17: 5' 10.87\" (1.8 m).    Weight as of this encounter: 193 lb 8 oz (87.8 kg).     Physical Exam   Constitutional: He is oriented to person, place, and time. He appears well-developed and well-nourished. No distress.   HENT:   Head: Normocephalic and atraumatic.   Eyes: Conjunctivae and EOM are normal. No scleral icterus.   Neck: No thyromegaly present.   Cardiovascular: Normal rate and regular rhythm.    No carotid Bruits   Pulmonary/Chest: Effort normal. No respiratory distress. He has no wheezes.   Abdominal: Soft. There is no tenderness.   Musculoskeletal: Normal range of motion. He exhibits no edema, tenderness or deformity.   No joint swelling appreciated.   Lymphadenopathy:     He has no cervical adenopathy.   Neurological: He is alert and oriented to person, place, and time. No cranial nerve deficit.   Skin: Skin is warm and dry.   Couple small skin tags around the shirt collar.   Psychiatric: He has a normal mood and affect.      INVESTIGATIONS:  Results for orders placed or performed in visit on 09/10/18   CBC and Differential   Result Value Ref Range    WBC 5.3 4.0 - 11.0 10e9/L    RBC Count 4.33 (L) 4.4 - 5.9 10e12/L    Hemoglobin 13.9 13.3 - 17.7 g/dL    Hematocrit 39.6 (L) 40.0 - 53.0 %    MCV 92 78 - 100 fl    MCH 32.1 26.5 - 33.0 pg    MCHC 35.1 31.5 - 36.5 g/dL    RDW 12.3 10.0 - 15.0 %    Platelet Count 250 150 - 450 10e9/L    Diff Method Automated Method     % Neutrophils 48.5 %    % Lymphocytes 38.8 %    % Monocytes 8.9 %    % Eosinophils 2.3 %    % Basophils 1.1 %    % Immature Granulocytes 0.4 %    Absolute Neutrophil 2.6 1.6 - 8.3 10e9/L    Absolute Lymphocytes 2.1 0.8 - 5.3 10e9/L    Absolute Monocytes 0.5 0.0 - 1.3 10e9/L    Absolute Eosinophils 0.1 0.0 - 0.7 10e9/L    Absolute Basophils 0.1 0.0 - 0.2 10e9/L    Abs Immature Granulocytes 0.0 0 - 0.4 10e9/L   Comprehensive metabolic panel "   Result Value Ref Range    Sodium 139 134 - 144 mmol/L    Potassium 3.8 3.5 - 5.1 mmol/L    Chloride 104 98 - 107 mmol/L    Carbon Dioxide 29 21 - 31 mmol/L    Anion Gap 6 3 - 14 mmol/L    Glucose 73 70 - 105 mg/dL    Urea Nitrogen 20 7 - 25 mg/dL    Creatinine 1.06 0.70 - 1.30 mg/dL    GFR Estimate 74 >60 mL/min/1.7m2    GFR Estimate If Black 89 >60 mL/min/1.7m2    Calcium 9.8 8.6 - 10.3 mg/dL    Bilirubin Total 0.5 0.3 - 1.0 mg/dL    Albumin 4.7 3.5 - 5.7 g/dL    Protein Total 7.3 6.4 - 8.9 g/dL    Alkaline Phosphatase 34 34 - 104 U/L    ALT 14 7 - 52 U/L    AST 18 13 - 39 U/L   Hemoglobin A1c   Result Value Ref Range    Hemoglobin A1C 5.3 4.0 - 6.0 %   Lipid Panel   Result Value Ref Range    Cholesterol 258 (H) <200 mg/dL    Triglycerides 68 <150 mg/dL    HDL Cholesterol 51 23 - 92 mg/dL    LDL Cholesterol Calculated 193 (H) <100 mg/dL    Non HDL Cholesterol 207 (H) <130 mg/dL       ASSESSMENT AND PLAN:  Problem List Items Addressed This Visit        Nervous and Auditory    Multiple joint pain       Digestive    Vitamin D deficiency       Behavioral    Anxiety associated with depression - Primary    Relevant Medications    venlafaxine (EFFEXOR-XR) 37.5 MG 24 hr capsule    Other Relevant Orders    CBC and Differential (Completed)    Comprehensive metabolic panel (Completed)      Other Visit Diagnoses     Routine general medical examination at a health care facility        Relevant Medications    multivitamin, therapeutic with minerals (MULTI-VITAMIN) TABS tablet    Other Relevant Orders    GASTROENTEROLOGY ADULT REF PROCEDURE ONLY    Special screening for malignant neoplasm of prostate        Relevant Orders    PSA Screen GH (Completed)    Vision changes        Relevant Orders    Hemoglobin A1c (Completed)    Lipid screening        Relevant Orders    Lipid Panel (Completed)        reviewed diet, exercise and weight control, recommended sodium restriction, cardiovascular risk and specific lipid/LDL goals  reviewed  -- Expected clinical course discussed    -- Medications and their side effects discussed    The 10-year ASCVD risk score (Clearwaterkailyn LEIJA Jr, et al., 2013) is: 4.7%    Values used to calculate the score:      Age: 53 years      Sex: Male      Is Non- : No      Diabetic: No      Tobacco smoker: No      Systolic Blood Pressure: 120 mmHg      Is BP treated: No      HDL Cholesterol: 51 mg/dL      Total Cholesterol: 258 mg/dL    Patient Instructions   Blood pressures are well controlled.     Effexor refilled.     Screening labs today.     Colonoscopy ordered  - they will call with date/time of appointment.      Return in approximately 1 year, or sooner as needed for follow-up with Dr. Galo.    Clinic : [phone]  Appointment line: [phone]      Dominic Galo MD  Internal Medicine  [facility]

## 2018-09-10 NOTE — LETTER
Zachariah Stout  [address] [facility] [address]    9/10/2018      Dear Zachariah Stout,    The result of your recent tests are included below:    Cholesterol levels are high, labs otherwise look pretty good.    Uncertain why your hemoglobin/hematocrit count is a little bit low... If you are using ibuprofen on a regular basis, this can cause some stomach irritation and would likely explain this.  --This is also another good reason to get colonoscopy completed to make sure he did not have any irritated/bleeding colon polyps.    PSA/prostate cancer lab is normal.    Diabetes/hemoglobin A1c lab is normal.    Kidney/liver labs are all normal.    Results for orders placed or performed in visit on 09/10/18   CBC and Differential   Result Value Ref Range    WBC 5.3 4.0 - 11.0 10e9/L    RBC Count 4.33 (L) 4.4 - 5.9 10e12/L    Hemoglobin 13.9 13.3 - 17.7 g/dL    Hematocrit 39.6 (L) 40.0 - 53.0 %    MCV 92 78 - 100 fl    MCH 32.1 26.5 - 33.0 pg    MCHC 35.1 31.5 - 36.5 g/dL    RDW 12.3 10.0 - 15.0 %    Platelet Count 250 150 - 450 10e9/L    Diff Method Automated Method     % Neutrophils 48.5 %    % Lymphocytes 38.8 %    % Monocytes 8.9 %    % Eosinophils 2.3 %    % Basophils 1.1 %    % Immature Granulocytes 0.4 %    Absolute Neutrophil 2.6 1.6 - 8.3 10e9/L    Absolute Lymphocytes 2.1 0.8 - 5.3 10e9/L    Absolute Monocytes 0.5 0.0 - 1.3 10e9/L    Absolute Eosinophils 0.1 0.0 - 0.7 10e9/L    Absolute Basophils 0.1 0.0 - 0.2 10e9/L    Abs Immature Granulocytes 0.0 0 - 0.4 10e9/L   Comprehensive metabolic panel   Result Value Ref Range    Sodium 139 134 - 144 mmol/L    Potassium 3.8 3.5 - 5.1 mmol/L    Chloride 104 98 - 107 mmol/L    Carbon Dioxide 29 21 - 31 mmol/L    Anion Gap 6 3 - 14 mmol/L    Glucose 73 70 - 105 mg/dL    Urea Nitrogen 20 7 - 25 mg/dL    Creatinine 1.06 0.70 - 1.30 mg/dL    GFR Estimate 74 >60 mL/min/1.7m2    GFR Estimate If Black 89 >60 mL/min/1.7m2    Calcium 9.8 8.6 - 10.3 mg/dL    Bilirubin Total 0.5  0.3 - 1.0 mg/dL    Albumin 4.7 3.5 - 5.7 g/dL    Protein Total 7.3 6.4 - 8.9 g/dL    Alkaline Phosphatase 34 34 - 104 U/L    ALT 14 7 - 52 U/L    AST 18 13 - 39 U/L   Hemoglobin A1c   Result Value Ref Range    Hemoglobin A1C 5.3 4.0 - 6.0 %   PSA Screen GH   Result Value Ref Range    PSA Screen 1.093 <3.100 ng/mL   Lipid Panel   Result Value Ref Range    Cholesterol 258 (H) <200 mg/dL    Triglycerides 68 <150 mg/dL    HDL Cholesterol 51 23 - 92 mg/dL    LDL Cholesterol Calculated 193 (H) <100 mg/dL    Non HDL Cholesterol 207 (H) <130 mg/dL       If you have any further questions or problems, please contact my office at [phone] and schedule an appointment.    Clinic : [phone]  Appointment line: [phone]     Thank you,    Dominic Galo MD    Internal Medicine  [facility] [place] [facility]     Reviewed and electronically signed by provider.

## 2018-09-11 ASSESSMENT — PATIENT HEALTH QUESTIONNAIRE - PHQ9: SUM OF ALL RESPONSES TO PHQ QUESTIONS 1-9: 0

## 2018-09-11 ASSESSMENT — ANXIETY QUESTIONNAIRES: GAD7 TOTAL SCORE: 0

## 2018-09-18 ENCOUNTER — TELEPHONE (OUTPATIENT)
Dept: INTERNAL MEDICINE | Facility: OTHER | Age: 53
End: 2018-09-18

## 2018-09-18 NOTE — TELEPHONE ENCOUNTER
Patient has been called and messages left on 09/11, 09/14 and 09/17/2018 to schedule colonoscopy.   Letter has been sent out today for him to call and schedule the colonoscopy.  Vanessa Zuñiga on 9/18/2018 at 10:42 AM

## 2019-10-04 DIAGNOSIS — F41.8 ANXIETY ASSOCIATED WITH DEPRESSION: ICD-10-CM

## 2019-10-04 NOTE — LETTER
October 7, 2019      Zachariah Stout  [address]  [place] [facility] [address]        Dear Zachariah,     A refill request was received from your pharmacy for Effexor.    Additional refills require an office visit with Dr. Galo for annual review.    Please call [phone] to schedule appointment.      Sincerely,      Refill Nurse

## 2019-10-07 RX ORDER — VENLAFAXINE HYDROCHLORIDE 37.5 MG/1
CAPSULE, EXTENDED RELEASE ORAL
Qty: 90 CAPSULE | Refills: 3 | Status: SHIPPED | OUTPATIENT
Start: 2019-10-07 | End: 2019-12-09

## 2019-10-07 NOTE — TELEPHONE ENCOUNTER
Routing refill request to provider for review/approval because:  Patient needs to be seen because it has been more than 1 year since last office visit.   Blood pressure under 140/90 in past 12 months    PHQ-9 score of less than 5 in past 6 months    Normal serum creatinine on file in past 12 months    Recent (6 mo) or future (30 days) visit within the authorizing provider's specialty       LOV: 9/10/18  Letter sent patient is due for annual review  Megan Walters RN on 10/7/2019 at 3:48 PM

## 2019-12-06 NOTE — PROGRESS NOTES
"Nursing Notes:   Shanika Larsen LPN  12/9/2019 10:35 AM  Addendum  Patient presents to the clinic for medication management, patient declines any lab work today.      Chief Complaint   Patient presents with     Recheck Medication       Initial /76 (BP Location: Right arm, Patient Position: Sitting, Cuff Size: Adult Regular)   Pulse 60   Temp 96.4  F (35.8  C) (Tympanic)   Resp 16   Ht 1.803 m (5' 11\")   Wt 88 kg (194 lb)   BMI 27.06 kg/m    Estimated body mass index is 27.06 kg/m  as calculated from the following:    Height as of this encounter: 1.803 m (5' 11\").    Weight as of this encounter: 88 kg (194 lb).  Medication Reconciliation: complete    Shanika Larsen LPN    Nursing note reviewed with patient.  Accuracy and completeness verified.   Mr. Sotut is a 54 year old male who:  Patient presents with:  Recheck Medication      ICD-10-CM    1. Annual physical exam Z00.00    2. Vitamin D deficiency E55.9 Vitamin D Total     Vitamin D Total   3. Anxiety associated with depression F41.8 venlafaxine (EFFEXOR-XR) 37.5 MG 24 hr capsule   4. Familial hyperlipidemia - At least Heterozygous -  in 2018 E78.49 atorvastatin (LIPITOR) 40 MG tablet     Comprehensive metabolic panel     CBC with platelets     Lipid Profile     Lipid Profile     CBC with platelets     Comprehensive metabolic panel   5. Vitamin B12 deficiency E53.8 vitamin B complex with vitamin C (VITAMIN  B COMPLEX) tablet   6. Screening PSA (prostate specific antigen) Z12.5 PSA Screen GH     PSA Screen GH   7. Encounter for screening colonoscopy Z12.11 GASTROENTEROLOGY ADULT REF PROCEDURE ONLY     HPI  Patient presents for annual physical.  Overall doing well.  Did have 1 syncopal type episode on the airplane this fall never happened before never happened since.  Has not had any other issues.  Advised to monitor.    Vitamin D and B12 deficiency.  Taking vitamin D 4000 IU daily.  Not taking any B12.  Start replacement.  Check labs " today.    Anxiety and depression, doing well with Effexor.  Needs refills.  No side effects reported.  Continue current dosing.    Familial hyperlipidemia, her LDL was 193 last year.  Did not want to start statins at that time.  We talked about cardiovascular risk and he is interested at this time.  Start Lipitor 20 mg daily to begin with increasing up to 40 mg daily.  Recheck labs in about 3 months.    Screening PSA today.    Due for colonoscopy, orders placed.    Passed out in Mid-October when flying home from work.     Flu shot has been obtained this fall.   Colonoscopy is due  - they will call with date/time of appointment.      Functional Capacity: > 4 METS.   Review of Systems   Constitutional: Positive for fatigue. Negative for chills and fever.   HENT: Negative for congestion and hearing loss.    Eyes: Negative for visual disturbance.   Respiratory: Negative for cough, shortness of breath and wheezing.    Cardiovascular: Negative for chest pain and palpitations.   Gastrointestinal: Negative for abdominal pain, diarrhea, nausea and vomiting.   Endocrine: Negative for cold intolerance and heat intolerance.   Genitourinary: Negative for dysuria and hematuria.   Musculoskeletal: Negative for arthralgias and myalgias.   Skin: Negative for rash and wound.   Allergic/Immunologic: Negative for immunocompromised state.   Neurological: Negative for dizziness and light-headedness.        Syncopal episode x1 while on airplane flying home in Mid October 2019, got nauseated mid-flight and passed out briefly... maybe out for about 1 minute. Never happened since.    Hematological: Does not bruise/bleed easily.   Psychiatric/Behavioral: Negative for agitation and confusion.      Problem List/PMH: reviewed in EMR, and made relevant updates today.  Medications: reviewed in EMR, and made relevant updates today.  Allergies: reviewed in EMR, and made relevant updates today.  I reviewed family and social history and made relevant  "updates today.  Social History     Tobacco Use     Smoking status: Never Smoker     Smokeless tobacco: Never Used   Substance Use Topics     Alcohol use: No     Drug use: No      History reviewed. No pertinent family history.    EXAM:   Vitals:    12/09/19 1033   BP: 120/76   BP Location: Right arm   Patient Position: Sitting   Cuff Size: Adult Regular   Pulse: 60   Resp: 16   Temp: 96.4  F (35.8  C)   TempSrc: Tympanic   Weight: 88 kg (194 lb)   Height: 1.803 m (5' 11\")       Current Pain Score: No Pain (0)     BP Readings from Last 3 Encounters:   12/09/19 120/76   09/10/18 120/74   07/28/17 110/64      Wt Readings from Last 3 Encounters:   12/09/19 88 kg (194 lb)   09/10/18 87.8 kg (193 lb 8 oz)   07/28/17 84 kg (185 lb 2 oz)      Estimated body mass index is 27.06 kg/m  as calculated from the following:    Height as of this encounter: 1.803 m (5' 11\").    Weight as of this encounter: 88 kg (194 lb).     Physical Exam  Constitutional:       General: He is not in acute distress.     Appearance: He is well-developed. He is not diaphoretic.   HENT:      Head: Normocephalic and atraumatic.   Eyes:      General: No scleral icterus.     Conjunctiva/sclera: Conjunctivae normal.   Neck:      Musculoskeletal: Neck supple.      Vascular: No carotid bruit.   Cardiovascular:      Rate and Rhythm: Normal rate and regular rhythm.      Pulses: Normal pulses.   Pulmonary:      Effort: Pulmonary effort is normal.      Breath sounds: Normal breath sounds.   Abdominal:      Palpations: Abdomen is soft.      Tenderness: There is no abdominal tenderness.   Musculoskeletal:         General: No deformity.      Right lower leg: No edema.      Left lower leg: No edema.   Lymphadenopathy:      Cervical: No cervical adenopathy.   Skin:     General: Skin is warm and dry.      Findings: No rash.   Neurological:      General: No focal deficit present.      Mental Status: He is alert.   Psychiatric:         Mood and Affect: Mood normal.        "  Behavior: Behavior normal.          Procedures   INVESTIGATIONS:  Results for orders placed or performed in visit on 12/09/19   Vitamin D Total     Status: None   Result Value Ref Range    Vitamin D Total 33.5 ng/mL   PSA Screen GH     Status: None   Result Value Ref Range    PSA Screen 1.420 <3.100 ng/mL   Lipid Profile     Status: Abnormal   Result Value Ref Range    Cholesterol 274 (H) <200 mg/dL    Triglycerides 142 <150 mg/dL    HDL Cholesterol 53 23 - 92 mg/dL    LDL Cholesterol Calculated 193 (H) <100 mg/dL    Non HDL Cholesterol 221 (H) <130 mg/dL   CBC with platelets     Status: None   Result Value Ref Range    WBC 7.0 4.0 - 11.0 10e9/L    RBC Count 4.57 4.4 - 5.9 10e12/L    Hemoglobin 14.6 13.3 - 17.7 g/dL    Hematocrit 42.3 40.0 - 53.0 %    MCV 93 78 - 100 fl    MCH 31.9 26.5 - 33.0 pg    MCHC 34.5 31.5 - 36.5 g/dL    RDW 12.4 10.0 - 15.0 %    Platelet Count 260 150 - 450 10e9/L   Comprehensive metabolic panel     Status: None   Result Value Ref Range    Sodium 141 134 - 144 mmol/L    Potassium 4.5 3.5 - 5.1 mmol/L    Chloride 105 98 - 107 mmol/L    Carbon Dioxide 29 21 - 31 mmol/L    Anion Gap 7 3 - 14 mmol/L    Glucose 95 70 - 105 mg/dL    Urea Nitrogen 19 7 - 25 mg/dL    Creatinine 1.02 0.70 - 1.30 mg/dL    GFR Estimate 77 >60 mL/min/[1.73_m2]    GFR Estimate If Black >90 >60 mL/min/[1.73_m2]    Calcium 9.7 8.6 - 10.3 mg/dL    Bilirubin Total 0.5 0.3 - 1.0 mg/dL    Albumin 4.5 3.5 - 5.7 g/dL    Protein Total 7.1 6.4 - 8.9 g/dL    Alkaline Phosphatase 36 34 - 104 U/L    ALT 18 7 - 52 U/L    AST 17 13 - 39 U/L       ASSESSMENT AND PLAN:  Problem List Items Addressed This Visit        Digestive    Vitamin D deficiency    Relevant Medications    vitamin D3 (CHOLECALCIFEROL) 2000 units (50 mcg) tablet    Other Relevant Orders    Vitamin D Total (Completed)       Endocrine    Familial hyperlipidemia    Relevant Medications    atorvastatin (LIPITOR) 40 MG tablet    Other Relevant Orders    Comprehensive  metabolic panel    CBC with platelets    Lipid Profile       Behavioral    Anxiety associated with depression    Relevant Medications    venlafaxine (EFFEXOR-XR) 37.5 MG 24 hr capsule      Other Visit Diagnoses     Annual physical exam    -  Primary    Vitamin B12 deficiency        Relevant Medications    vitamin B complex with vitamin C (VITAMIN  B COMPLEX) tablet    Screening PSA (prostate specific antigen)        Relevant Orders    PSA Screen GH (Completed)    Encounter for screening colonoscopy        Relevant Orders    GASTROENTEROLOGY ADULT REF PROCEDURE ONLY        reviewed diet, exercise and weight control, recommended sodium restriction  -- Expected clinical course discussed    -- Medications and their side effects discussed    The 10-year ASCVD risk score (Everettkailyn LEIJA Jr., et al., 2013) is: 5.4%    Values used to calculate the score:      Age: 54 years      Sex: Male      Is Non- : No      Diabetic: No      Tobacco smoker: No      Systolic Blood Pressure: 120 mmHg      Is BP treated: No      HDL Cholesterol: 53 mg/dL      Total Cholesterol: 274 mg/dL    Patient Instructions   Blood pressure is well controlled.    Last 2 cholesterol levels have been rather high.  Possible familial hyperlipidemia, at least one genetic abnormality given your LDL was over 190.    Most recent cardiology recommendations are to reduced LDL cholesterol as low as possible.    Continue with regular exercise, diet, weight loss, carbohydrate intake reduction.    Start Lipitor 20 mg daily for now, increased dose up to 40 mg daily if tolerating well.    Screening PSA for prostate cancer lab today.    Schedule lab only appointment in 3 to 4 months to follow-up on your cholesterol levels.    Colonoscopy due  - they will call with date/time of appointment.      Return in approximately 1 year, or sooner as needed for follow-up with Dr. Galo.  - Annual Physical    Clinic : [phone]  Appointment line:  [phone]     Dominic Galo MD  Internal Medicine  [facility]     Portions of this note were dictated using speech recognition software. The note has been proofread but errors in the text may have been overlooked. Please contact me if there are any concerns regarding the accuracy of the dictation.

## 2019-12-09 ENCOUNTER — OFFICE VISIT (OUTPATIENT)
Dept: INTERNAL MEDICINE | Facility: OTHER | Age: 54
End: 2019-12-09
Attending: INTERNAL MEDICINE
Payer: COMMERCIAL

## 2019-12-09 VITALS
RESPIRATION RATE: 16 BRPM | BODY MASS INDEX: 27.16 KG/M2 | SYSTOLIC BLOOD PRESSURE: 120 MMHG | WEIGHT: 194 LBS | HEIGHT: 71 IN | TEMPERATURE: 96.4 F | HEART RATE: 60 BPM | DIASTOLIC BLOOD PRESSURE: 76 MMHG

## 2019-12-09 DIAGNOSIS — F41.8 ANXIETY ASSOCIATED WITH DEPRESSION: ICD-10-CM

## 2019-12-09 DIAGNOSIS — E78.49 FAMILIAL HYPERLIPIDEMIA: ICD-10-CM

## 2019-12-09 DIAGNOSIS — E53.8 VITAMIN B12 DEFICIENCY: ICD-10-CM

## 2019-12-09 DIAGNOSIS — Z00.00 ANNUAL PHYSICAL EXAM: Primary | ICD-10-CM

## 2019-12-09 DIAGNOSIS — Z12.5 SCREENING PSA (PROSTATE SPECIFIC ANTIGEN): ICD-10-CM

## 2019-12-09 DIAGNOSIS — Z12.11 ENCOUNTER FOR SCREENING COLONOSCOPY: ICD-10-CM

## 2019-12-09 DIAGNOSIS — E55.9 VITAMIN D DEFICIENCY: ICD-10-CM

## 2019-12-09 LAB
ALBUMIN SERPL-MCNC: 4.5 G/DL (ref 3.5–5.7)
ALP SERPL-CCNC: 36 U/L (ref 34–104)
ALT SERPL W P-5'-P-CCNC: 18 U/L (ref 7–52)
ANION GAP SERPL CALCULATED.3IONS-SCNC: 7 MMOL/L (ref 3–14)
AST SERPL W P-5'-P-CCNC: 17 U/L (ref 13–39)
BILIRUB SERPL-MCNC: 0.5 MG/DL (ref 0.3–1)
BUN SERPL-MCNC: 19 MG/DL (ref 7–25)
CALCIUM SERPL-MCNC: 9.7 MG/DL (ref 8.6–10.3)
CHLORIDE SERPL-SCNC: 105 MMOL/L (ref 98–107)
CHOLEST SERPL-MCNC: 274 MG/DL
CO2 SERPL-SCNC: 29 MMOL/L (ref 21–31)
CREAT SERPL-MCNC: 1.02 MG/DL (ref 0.7–1.3)
DEPRECATED CALCIDIOL+CALCIFEROL SERPL-MC: 33.5 NG/ML
ERYTHROCYTE [DISTWIDTH] IN BLOOD BY AUTOMATED COUNT: 12.4 % (ref 10–15)
GFR SERPL CREATININE-BSD FRML MDRD: 77 ML/MIN/{1.73_M2}
GLUCOSE SERPL-MCNC: 95 MG/DL (ref 70–105)
HCT VFR BLD AUTO: 42.3 % (ref 40–53)
HDLC SERPL-MCNC: 53 MG/DL (ref 23–92)
HGB BLD-MCNC: 14.6 G/DL (ref 13.3–17.7)
LDLC SERPL CALC-MCNC: 193 MG/DL
MCH RBC QN AUTO: 31.9 PG (ref 26.5–33)
MCHC RBC AUTO-ENTMCNC: 34.5 G/DL (ref 31.5–36.5)
MCV RBC AUTO: 93 FL (ref 78–100)
NONHDLC SERPL-MCNC: 221 MG/DL
PLATELET # BLD AUTO: 260 10E9/L (ref 150–450)
POTASSIUM SERPL-SCNC: 4.5 MMOL/L (ref 3.5–5.1)
PROT SERPL-MCNC: 7.1 G/DL (ref 6.4–8.9)
PSA SERPL-ACNC: 1.42 NG/ML
RBC # BLD AUTO: 4.57 10E12/L (ref 4.4–5.9)
SODIUM SERPL-SCNC: 141 MMOL/L (ref 134–144)
TRIGL SERPL-MCNC: 142 MG/DL
WBC # BLD AUTO: 7 10E9/L (ref 4–11)

## 2019-12-09 PROCEDURE — 99396 PREV VISIT EST AGE 40-64: CPT | Performed by: INTERNAL MEDICINE

## 2019-12-09 PROCEDURE — 80061 LIPID PANEL: CPT | Mod: ZL | Performed by: INTERNAL MEDICINE

## 2019-12-09 PROCEDURE — 80053 COMPREHEN METABOLIC PANEL: CPT | Mod: ZL | Performed by: INTERNAL MEDICINE

## 2019-12-09 PROCEDURE — 36415 COLL VENOUS BLD VENIPUNCTURE: CPT | Mod: ZL | Performed by: INTERNAL MEDICINE

## 2019-12-09 PROCEDURE — G0103 PSA SCREENING: HCPCS | Mod: ZL | Performed by: INTERNAL MEDICINE

## 2019-12-09 PROCEDURE — 85027 COMPLETE CBC AUTOMATED: CPT | Mod: ZL | Performed by: INTERNAL MEDICINE

## 2019-12-09 PROCEDURE — 82306 VITAMIN D 25 HYDROXY: CPT | Mod: ZL | Performed by: INTERNAL MEDICINE

## 2019-12-09 RX ORDER — ATORVASTATIN CALCIUM 40 MG/1
TABLET, FILM COATED ORAL
Qty: 90 TABLET | Refills: 3 | Status: SHIPPED | OUTPATIENT
Start: 2019-12-09 | End: 2020-03-22

## 2019-12-09 RX ORDER — CHOLECALCIFEROL (VITAMIN D3) 50 MCG
2 TABLET ORAL DAILY
COMMUNITY
Start: 2019-12-09

## 2019-12-09 RX ORDER — VENLAFAXINE HYDROCHLORIDE 37.5 MG/1
37.5 CAPSULE, EXTENDED RELEASE ORAL DAILY
Qty: 90 CAPSULE | Refills: 3 | Status: SHIPPED | OUTPATIENT
Start: 2019-12-09

## 2019-12-09 ASSESSMENT — ENCOUNTER SYMPTOMS
FATIGUE: 1
DIARRHEA: 0
CONFUSION: 0
NAUSEA: 0
FEVER: 0
ABDOMINAL PAIN: 0
VOMITING: 0
AGITATION: 0
COUGH: 0
DYSURIA: 0
BRUISES/BLEEDS EASILY: 0
SHORTNESS OF BREATH: 0
PALPITATIONS: 0
WOUND: 0
WHEEZING: 0
HEMATURIA: 0
LIGHT-HEADEDNESS: 0
ARTHRALGIAS: 0
MYALGIAS: 0
DIZZINESS: 0
CHILLS: 0

## 2019-12-09 ASSESSMENT — ANXIETY QUESTIONNAIRES
GAD7 TOTAL SCORE: 0
1. FEELING NERVOUS, ANXIOUS, OR ON EDGE: NOT AT ALL
3. WORRYING TOO MUCH ABOUT DIFFERENT THINGS: NOT AT ALL
6. BECOMING EASILY ANNOYED OR IRRITABLE: NOT AT ALL
2. NOT BEING ABLE TO STOP OR CONTROL WORRYING: NOT AT ALL
7. FEELING AFRAID AS IF SOMETHING AWFUL MIGHT HAPPEN: NOT AT ALL
IF YOU CHECKED OFF ANY PROBLEMS ON THIS QUESTIONNAIRE, HOW DIFFICULT HAVE THESE PROBLEMS MADE IT FOR YOU TO DO YOUR WORK, TAKE CARE OF THINGS AT HOME, OR GET ALONG WITH OTHER PEOPLE: NOT DIFFICULT AT ALL
5. BEING SO RESTLESS THAT IT IS HARD TO SIT STILL: NOT AT ALL

## 2019-12-09 ASSESSMENT — MIFFLIN-ST. JEOR: SCORE: 1752.11

## 2019-12-09 ASSESSMENT — PATIENT HEALTH QUESTIONNAIRE - PHQ9
5. POOR APPETITE OR OVEREATING: NOT AT ALL
SUM OF ALL RESPONSES TO PHQ QUESTIONS 1-9: 0

## 2019-12-09 ASSESSMENT — PAIN SCALES - GENERAL: PAINLEVEL: NO PAIN (0)

## 2019-12-09 NOTE — NURSING NOTE
"Patient presents to the clinic for medication management, patient declines any lab work today.      Chief Complaint   Patient presents with     Recheck Medication       Initial /76 (BP Location: Right arm, Patient Position: Sitting, Cuff Size: Adult Regular)   Pulse 60   Temp 96.4  F (35.8  C) (Tympanic)   Resp 16   Ht 1.803 m (5' 11\")   Wt 88 kg (194 lb)   BMI 27.06 kg/m   Estimated body mass index is 27.06 kg/m  as calculated from the following:    Height as of this encounter: 1.803 m (5' 11\").    Weight as of this encounter: 88 kg (194 lb).  Medication Reconciliation: complete    Shanika Larsen LPN    "

## 2019-12-09 NOTE — LETTER
December 9, 2019      Zachariah Stout  [address] [facility] [address]        Dear ,    We are writing to inform you of your test results.    Vitamin D remains slightly low... consider taking additional 1000 to 2000 international unit(s) daily.     PSA is normal.     Cholesterol (LDL) remains elevated.     Blood counts and metabolic panel are normal.     Resulted Orders   Vitamin D Total   Result Value Ref Range    Vitamin D Total 33.5 ng/mL      Comment:      Comments:  Deficiency:             <10 ng/mL  Insufficiency:        10-29 ng/mL  Sufficiency:          ng/mL  Possible Toxicity:     >100 ng/mL     PSA Screen GH   Result Value Ref Range    PSA Screen 1.420 <3.100 ng/mL      Comment:      The DXI Access PSAS WHO assay is a two site immunoenzymatic assay. Assay   values obtained with different assay methods cannot be used interchangeably   due to differences in assay methods and reagent specificity.     Lipid Profile   Result Value Ref Range    Cholesterol 274 (H) <200 mg/dL    Triglycerides 142 <150 mg/dL    HDL Cholesterol 53 23 - 92 mg/dL    LDL Cholesterol Calculated 193 (H) <100 mg/dL      Comment:      Above desirable:  100-129 mg/dl  Borderline High:  130-159 mg/dL  High:             160-189 mg/dL  Very high:       >189 mg/dl      Non HDL Cholesterol 221 (H) <130 mg/dL      Comment:      Above Desirable:  130-159 mg/dl  Borderline high:  160-189 mg/dl  High:             190-219 mg/dl  Very high:       >219 mg/dl     CBC with platelets   Result Value Ref Range    WBC 7.0 4.0 - 11.0 10e9/L    RBC Count 4.57 4.4 - 5.9 10e12/L    Hemoglobin 14.6 13.3 - 17.7 g/dL    Hematocrit 42.3 40.0 - 53.0 %    MCV 93 78 - 100 fl    MCH 31.9 26.5 - 33.0 pg    MCHC 34.5 31.5 - 36.5 g/dL    RDW 12.4 10.0 - 15.0 %    Platelet Count 260 150 - 450 10e9/L   Comprehensive metabolic panel   Result Value Ref Range    Sodium 141 134 - 144 mmol/L    Potassium 4.5 3.5 - 5.1 mmol/L    Chloride 105 98 - 107  mmol/L    Carbon Dioxide 29 21 - 31 mmol/L    Anion Gap 7 3 - 14 mmol/L    Glucose 95 70 - 105 mg/dL    Urea Nitrogen 19 7 - 25 mg/dL    Creatinine 1.02 0.70 - 1.30 mg/dL    GFR Estimate 77 >60 mL/min/[1.73_m2]    GFR Estimate If Black >90 >60 mL/min/[1.73_m2]    Calcium 9.7 8.6 - 10.3 mg/dL    Bilirubin Total 0.5 0.3 - 1.0 mg/dL    Albumin 4.5 3.5 - 5.7 g/dL    Protein Total 7.1 6.4 - 8.9 g/dL    Alkaline Phosphatase 36 34 - 104 U/L    ALT 18 7 - 52 U/L    AST 17 13 - 39 U/L       If you have any questions or concerns, please call the clinic at the number listed above.       Sincerely,        Dominic Galo MD

## 2019-12-09 NOTE — PATIENT INSTRUCTIONS
Blood pressure is well controlled.    Last 2 cholesterol levels have been rather high.  Possible familial hyperlipidemia, at least one genetic abnormality given your LDL was over 190.    Most recent cardiology recommendations are to reduced LDL cholesterol as low as possible.    Continue with regular exercise, diet, weight loss, carbohydrate intake reduction.    Start Lipitor 20 mg daily for now, increased dose up to 40 mg daily if tolerating well.    Screening PSA for prostate cancer lab today.    Schedule lab only appointment in 3 to 4 months to follow-up on your cholesterol levels.    Colonoscopy due  - they will call with date/time of appointment.      Return in approximately 1 year, or sooner as needed for follow-up with Dr. Galo.  - Annual Physical    Clinic : [phone]  Appointment line: [phone]

## 2019-12-10 ASSESSMENT — ANXIETY QUESTIONNAIRES: GAD7 TOTAL SCORE: 0

## 2019-12-13 ENCOUNTER — TELEPHONE (OUTPATIENT)
Dept: INTERNAL MEDICINE | Facility: OTHER | Age: 54
End: 2019-12-13

## 2019-12-13 NOTE — TELEPHONE ENCOUNTER
Patient has been called and messages left to schedule colonoscopy on 12/10 12/11 and 12/13.  Letter has been sent out today for him to call and schedule.  Vanessa Zuñiga on 12/13/2019 at 9:48 AM

## 2019-12-30 DIAGNOSIS — Z12.11 ENCOUNTER FOR SCREENING COLONOSCOPY: Primary | ICD-10-CM

## 2019-12-30 RX ORDER — POLYETHYLENE GLYCOL 3350, SODIUM CHLORIDE, SODIUM BICARBONATE, POTASSIUM CHLORIDE 420; 11.2; 5.72; 1.48 G/4L; G/4L; G/4L; G/4L
4000 POWDER, FOR SOLUTION ORAL ONCE
Qty: 4000 ML | Refills: 0 | Status: ON HOLD | OUTPATIENT
Start: 2019-12-30 | End: 2020-02-13

## 2019-12-30 RX ORDER — BISACODYL 5 MG/1
TABLET, DELAYED RELEASE ORAL
Qty: 2 TABLET | Refills: 0 | Status: ON HOLD | OUTPATIENT
Start: 2019-12-30 | End: 2020-02-13

## 2019-12-30 NOTE — TELEPHONE ENCOUNTER
Screening Questions for the Scheduling of Screening Colonoscopies   (If Colonoscopy is diagnostic, Provider should review the chart before scheduling.)  Are you younger than 50 or older than 80?  NO   Do you take aspirin or fish oil?  NO  (if yes, tell patient to stop 1 week prior to Colonoscopy)  Do you take warfarin (Coumadin), clopidogrel (Plavix), apixaban (Eliquis), dabigatram (Pradaxa), rivaroxaban (Xarelto) or any blood thinner? NO   Do you use oxygen at home?  NO   Do you have kidney disease? NO   Are you on dialysis? NO   Have you had a stroke or heart attack in the last year? NO   Have you had a stent in your heart or any blood vessel in the last year? NO   Have you had a transplant of any organ? NO   Have you had a colonoscopy or upper endoscopy (EGD) before? NO          When?    Date of scheduled Colonoscopy. 02/13/2020  Provider [facility]   Pharmacy [facility]

## 2020-02-12 ENCOUNTER — ANESTHESIA EVENT (OUTPATIENT)
Dept: SURGERY | Facility: OTHER | Age: 55
End: 2020-02-12
Payer: COMMERCIAL

## 2020-02-12 RX ORDER — FENTANYL CITRATE 50 UG/ML
25-50 INJECTION, SOLUTION INTRAMUSCULAR; INTRAVENOUS
Status: CANCELLED | OUTPATIENT
Start: 2020-02-12

## 2020-02-13 ENCOUNTER — HOSPITAL ENCOUNTER (OUTPATIENT)
Facility: OTHER | Age: 55
Discharge: HOME | End: 2020-02-13
Attending: SURGERY | Admitting: SURGERY
Payer: COMMERCIAL

## 2020-02-13 ENCOUNTER — ANESTHESIA (OUTPATIENT)
Dept: SURGERY | Facility: OTHER | Age: 55
End: 2020-02-13
Payer: COMMERCIAL

## 2020-02-13 VITALS
OXYGEN SATURATION: 98 % | SYSTOLIC BLOOD PRESSURE: 100 MMHG | RESPIRATION RATE: 16 BRPM | TEMPERATURE: 96.7 F | HEART RATE: 70 BPM | DIASTOLIC BLOOD PRESSURE: 62 MMHG

## 2020-02-13 PROCEDURE — 45385 COLONOSCOPY W/LESION REMOVAL: CPT | Performed by: SURGERY

## 2020-02-13 PROCEDURE — 40000010 ZZH STATISTIC ANES STAT CODE-CRNA PER MINUTE: Performed by: SURGERY

## 2020-02-13 PROCEDURE — 25000125 ZZHC RX 250: Performed by: SURGERY

## 2020-02-13 PROCEDURE — 25000128 H RX IP 250 OP 636: Performed by: NURSE ANESTHETIST, CERTIFIED REGISTERED

## 2020-02-13 PROCEDURE — 88305 TISSUE EXAM BY PATHOLOGIST: CPT

## 2020-02-13 PROCEDURE — 45385 COLONOSCOPY W/LESION REMOVAL: CPT | Mod: PT | Performed by: SURGERY

## 2020-02-13 PROCEDURE — 25800030 ZZH RX IP 258 OP 636: Performed by: NURSE ANESTHETIST, CERTIFIED REGISTERED

## 2020-02-13 PROCEDURE — 45385 COLONOSCOPY W/LESION REMOVAL: CPT | Performed by: NURSE ANESTHETIST, CERTIFIED REGISTERED

## 2020-02-13 PROCEDURE — 25000125 ZZHC RX 250: Performed by: NURSE ANESTHETIST, CERTIFIED REGISTERED

## 2020-02-13 RX ORDER — ONDANSETRON 4 MG/1
4 TABLET, ORALLY DISINTEGRATING ORAL EVERY 30 MIN PRN
Status: DISCONTINUED | OUTPATIENT
Start: 2020-02-13 | End: 2020-02-13 | Stop reason: HOSPADM

## 2020-02-13 RX ORDER — HYDROMORPHONE HYDROCHLORIDE 1 MG/ML
.3-.5 INJECTION, SOLUTION INTRAMUSCULAR; INTRAVENOUS; SUBCUTANEOUS EVERY 10 MIN PRN
Status: DISCONTINUED | OUTPATIENT
Start: 2020-02-13 | End: 2020-02-13 | Stop reason: HOSPADM

## 2020-02-13 RX ORDER — PROPOFOL 10 MG/ML
INJECTION, EMULSION INTRAVENOUS CONTINUOUS PRN
Status: DISCONTINUED | OUTPATIENT
Start: 2020-02-13 | End: 2020-02-13

## 2020-02-13 RX ORDER — SODIUM CHLORIDE, SODIUM LACTATE, POTASSIUM CHLORIDE, CALCIUM CHLORIDE 600; 310; 30; 20 MG/100ML; MG/100ML; MG/100ML; MG/100ML
INJECTION, SOLUTION INTRAVENOUS CONTINUOUS
Status: DISCONTINUED | OUTPATIENT
Start: 2020-02-13 | End: 2020-02-13 | Stop reason: HOSPADM

## 2020-02-13 RX ORDER — LIDOCAINE 40 MG/G
CREAM TOPICAL
Status: DISCONTINUED | OUTPATIENT
Start: 2020-02-13 | End: 2020-02-13 | Stop reason: HOSPADM

## 2020-02-13 RX ORDER — LIDOCAINE HYDROCHLORIDE 20 MG/ML
INJECTION, SOLUTION INFILTRATION; PERINEURAL PRN
Status: DISCONTINUED | OUTPATIENT
Start: 2020-02-13 | End: 2020-02-13

## 2020-02-13 RX ORDER — FLUMAZENIL 0.1 MG/ML
0.2 INJECTION, SOLUTION INTRAVENOUS
Status: CANCELLED | OUTPATIENT
Start: 2020-02-13 | End: 2020-02-13

## 2020-02-13 RX ORDER — MEPERIDINE HYDROCHLORIDE 50 MG/ML
12.5 INJECTION INTRAMUSCULAR; INTRAVENOUS; SUBCUTANEOUS
Status: DISCONTINUED | OUTPATIENT
Start: 2020-02-13 | End: 2020-02-13 | Stop reason: HOSPADM

## 2020-02-13 RX ORDER — ONDANSETRON 2 MG/ML
4 INJECTION INTRAMUSCULAR; INTRAVENOUS
Status: DISCONTINUED | OUTPATIENT
Start: 2020-02-13 | End: 2020-02-13 | Stop reason: HOSPADM

## 2020-02-13 RX ORDER — SODIUM CHLORIDE, SODIUM LACTATE, POTASSIUM CHLORIDE, CALCIUM CHLORIDE 600; 310; 30; 20 MG/100ML; MG/100ML; MG/100ML; MG/100ML
INJECTION, SOLUTION INTRAVENOUS CONTINUOUS
Status: CANCELLED | OUTPATIENT
Start: 2020-02-13

## 2020-02-13 RX ORDER — GLYCOPYRROLATE 0.2 MG/ML
INJECTION, SOLUTION INTRAMUSCULAR; INTRAVENOUS PRN
Status: DISCONTINUED | OUTPATIENT
Start: 2020-02-13 | End: 2020-02-13

## 2020-02-13 RX ORDER — NALOXONE HYDROCHLORIDE 0.4 MG/ML
.1-.4 INJECTION, SOLUTION INTRAMUSCULAR; INTRAVENOUS; SUBCUTANEOUS
Status: CANCELLED | OUTPATIENT
Start: 2020-02-13 | End: 2020-02-14

## 2020-02-13 RX ORDER — ONDANSETRON 2 MG/ML
4 INJECTION INTRAMUSCULAR; INTRAVENOUS EVERY 30 MIN PRN
Status: DISCONTINUED | OUTPATIENT
Start: 2020-02-13 | End: 2020-02-13 | Stop reason: HOSPADM

## 2020-02-13 RX ORDER — PROPOFOL 10 MG/ML
INJECTION, EMULSION INTRAVENOUS PRN
Status: DISCONTINUED | OUTPATIENT
Start: 2020-02-13 | End: 2020-02-13

## 2020-02-13 RX ORDER — SODIUM CHLORIDE, SODIUM LACTATE, POTASSIUM CHLORIDE, CALCIUM CHLORIDE 600; 310; 30; 20 MG/100ML; MG/100ML; MG/100ML; MG/100ML
INJECTION, SOLUTION INTRAVENOUS CONTINUOUS PRN
Status: DISCONTINUED | OUTPATIENT
Start: 2020-02-13 | End: 2020-02-13

## 2020-02-13 RX ORDER — NALOXONE HYDROCHLORIDE 0.4 MG/ML
.1-.4 INJECTION, SOLUTION INTRAMUSCULAR; INTRAVENOUS; SUBCUTANEOUS
Status: DISCONTINUED | OUTPATIENT
Start: 2020-02-13 | End: 2020-02-13 | Stop reason: HOSPADM

## 2020-02-13 RX ADMIN — PROPOFOL 130 MCG/KG/MIN: 10 INJECTION, EMULSION INTRAVENOUS at 08:28

## 2020-02-13 RX ADMIN — LIDOCAINE HYDROCHLORIDE 40 MG: 20 INJECTION, SOLUTION INFILTRATION; PERINEURAL at 08:28

## 2020-02-13 RX ADMIN — PROPOFOL 50 MG: 10 INJECTION, EMULSION INTRAVENOUS at 08:28

## 2020-02-13 RX ADMIN — SODIUM CHLORIDE, POTASSIUM CHLORIDE, SODIUM LACTATE AND CALCIUM CHLORIDE: 600; 310; 30; 20 INJECTION, SOLUTION INTRAVENOUS at 08:23

## 2020-02-13 RX ADMIN — GLYCOPYRROLATE 0.2 MG: 0.2 INJECTION, SOLUTION INTRAMUSCULAR; INTRAVENOUS at 08:28

## 2020-02-13 RX ADMIN — SODIUM CHLORIDE, POTASSIUM CHLORIDE, SODIUM LACTATE AND CALCIUM CHLORIDE: 600; 310; 30; 20 INJECTION, SOLUTION INTRAVENOUS at 08:21

## 2020-02-13 NOTE — ANESTHESIA PREPROCEDURE EVALUATION
Anesthesia Pre-Procedure Evaluation    Patient: Zachariah Stout   MRN: [id number] : 1965          Preoperative Diagnosis: Encounter for screening colonoscopy [Z12.11]    Procedure(s):  COLONOSCOPY    Past Medical History:   Diagnosis Date     Encounter for general adult medical examination without abnormal findings     No Comments Provided     Healed perforation of right ear drum 2015     Past Surgical History:   Procedure Laterality Date     OTHER SURGICAL HISTORY      [id number],NO PREVIOUS SURGERY       Anesthesia Evaluation     . Pt has not had prior anesthetic            ROS/MED HX    ENT/Pulmonary:  - neg pulmonary ROS     Neurologic:  - neg neurologic ROS     Cardiovascular: Comment: Has a consistently low heart rate.  Had and EKG with rate of 37 in the past.  He runs in the low 50's usually.  He is asymptomatic.          METS/Exercise Tolerance:  >4 METS   Hematologic:  - neg hematologic  ROS       Musculoskeletal:  - neg musculoskeletal ROS       GI/Hepatic:  - neg GI/hepatic ROS       Renal/Genitourinary:  - ROS Renal section negative       Endo:  - neg endo ROS       Psychiatric:     (+) psychiatric history anxiety and depression      Infectious Disease:  - neg infectious disease ROS       Malignancy:      - no malignancy   Other:    - neg other ROS                      Physical Exam  Normal systems: pulmonary and dental    Airway   Mallampati: I  TM distance: >3 FB  Neck ROM: full    Dental     Cardiovascular   Rhythm and rate: regular and normal      Pulmonary             Lab Results   Component Value Date    WBC 7.0 2019    HGB 14.6 2019    HCT 42.3 2019     2019     2019    POTASSIUM 4.5 2019    CHLORIDE 105 2019    CO2 29 2019    BUN 19 2019    CR 1.02 2019    GLC 95 2019    SAHRA 9.7 2019    ALBUMIN 4.5 2019    PROTTOTAL 7.1 2019    ALT 18 2019    AST 17 2019    ALKPHOS 36 2019     "BILITOTAL 0.5 12/09/2019       Preop Vitals  BP Readings from Last 3 Encounters:   02/13/20 119/60   12/09/19 120/76   09/10/18 120/74    Pulse Readings from Last 3 Encounters:   12/09/19 60   09/10/18 56   07/28/17 56      Resp Readings from Last 3 Encounters:   02/13/20 16   12/09/19 16   07/16/15 20    SpO2 Readings from Last 3 Encounters:   02/13/20 100%      Temp Readings from Last 1 Encounters:   02/13/20 96.7  F (35.9  C) (Tympanic)    Ht Readings from Last 1 Encounters:   12/09/19 1.803 m (5' 11\")      Wt Readings from Last 1 Encounters:   12/09/19 88 kg (194 lb)    Estimated body mass index is 27.06 kg/m  as calculated from the following:    Height as of 12/9/19: 1.803 m (5' 11\").    Weight as of 12/9/19: 88 kg (194 lb).       Anesthesia Plan      History & Physical Review      ASA Status:  1 .    NPO Status:  > 8 hours    Plan for MAC with Propofol induction.          Postoperative Care      Consents  Anesthetic plan, risks, benefits and alternatives discussed with:  Patient..                 ERICA ENNIS CRNA  "

## 2020-02-13 NOTE — H&P
PRE-PROCEDURE NOTE    CHIEFCOMPLAINT / REASON FOR PROCEDURE:  Screening for polyps and colorectal cancer.    PERTINENT HISTORY   Patient is due for colonoscopy. Previous colonoscopy - none. No  family history of colon polyps or colon cancer.    Past Medical History:   Diagnosis Date     Encounter for general adult medical examination without abnormal findings     No Comments Provided     Healed perforation of right ear drum 8/28/2015       Past Surgical History:   Procedure Laterality Date     OTHER SURGICAL HISTORY      [id number],NO PREVIOUS SURGERY         Other:  None  Bleeding tendencies: No     Relevant Family History:  None     Relevant Social History:  None     10 point ROS of systems including Constitutional, Eyes, Respiratory, Cardiovascular, Gastroenterology, Genitourinary, Integumentary, Muscularskeletal, Psychiatric were all negative except for pertinent positives noted in my HPI.      ALLERGIES/SENSITIVITIES: No Known Allergies     CURRENT MEDICATIONS:    No current facility-administered medications on file prior to encounter.   atorvastatin (LIPITOR) 40 MG tablet, Take 0.5 tablets (20 mg) by mouth daily for 14 days, THEN 1 tablet (40 mg) daily.  multivitamin, therapeutic with minerals (MULTI-VITAMIN) TABS tablet, Take 1 tablet by mouth daily  venlafaxine (EFFEXOR-XR) 37.5 MG 24 hr capsule, Take 1 capsule (37.5 mg) by mouth daily  vitamin B complex with vitamin C (VITAMIN  B COMPLEX) tablet, Take 1 tablet by mouth every other day -- make sure it has B12 in tablet  vitamin D3 (CHOLECALCIFEROL) 2000 units (50 mcg) tablet, Take 2 tablets (4,000 Units) by mouth daily - as of 12/9/2019            PRE-SEDATION ASSESSMENT:    LUNGS:  CTA B/L, no wheezing or crackles.  Heart & CV:  RRR no murmur.  Intact distal pulses, good cap refill.    Comment(s):      IMPRESSION: 54 year old male in need of screening colonoscopy.    PLAN:  I discussed screening colonoscopy with the patient. Anesthesia coverage  requested.    Johnny Campbell MD    2/13/2020 8:09 AM

## 2020-02-13 NOTE — ANESTHESIA POSTPROCEDURE EVALUATION
Patient: Zachariah Stout    Procedure(s):  COLONOSCOPY, FLEXIBLE, WITH POLYPECTOMY  USING SNARE    Diagnosis:Encounter for screening colonoscopy [Z12.11]  Diagnosis Additional Information: No value filed.    Anesthesia Type:  MAC    Note:  Anesthesia Post Evaluation    Patient location during evaluation: Phase 2  Patient participation: Able to fully participate in evaluation  Level of consciousness: awake and alert  Pain management: adequate  Airway patency: patent  Cardiovascular status: acceptable  Respiratory status: acceptable  Hydration status: acceptable  PONV: none             Last vitals:  Vitals:    02/13/20 0910 02/13/20 0915 02/13/20 0930   BP: 91/56 96/59 (!) 88/47   Pulse: 70 67 58   Resp:      Temp:      SpO2: 99% 98% 98%         Electronically Signed By: REICA ENNIS CRNA  February 13, 2020  10:13 AM

## 2020-02-13 NOTE — ANESTHESIA CARE TRANSFER NOTE
Patient: Zachariah Stout    Procedure(s):  COLONOSCOPY, FLEXIBLE, WITH POLYPECTOMY  USING SNARE    Diagnosis: Encounter for screening colonoscopy [Z12.11]  Diagnosis Additional Information: No value filed.    Anesthesia Type:   MAC     Note:  Airway :Face Mask (Patient spont. breathing well. RR 14. SPO2 98%. Patient transported on O2 @ 10L/min via simple ,mask. Patient cont. on O2 therapy during care tramsfer)  Patient transferred to:Phase II  Handoff Report: Identifed the Patient, Identified the Reponsible Provider, Reviewed the pertinent medical history, Discussed the surgical course, Reviewed Intra-OP anesthesia mangement and issues during anesthesia, Set expectations for post-procedure period and Allowed opportunity for questions and acknowledgement of understanding      Vitals: (Last set prior to Anesthesia Care Transfer)    CRNA VITALS  2/13/2020 0838 - 2/13/2020 0911      2/13/2020             Resp Rate (set):  10                Electronically Signed By: David Kellerman, APRN CRNA  February 13, 2020  9:11 AM

## 2020-02-13 NOTE — OP NOTE
PROCEDURE NOTE    SURGEON:Johnny Campbell MD    PRE-OP DIAGNOSIS:  Screening Colonoscopy      POST-OP DIAGNOSIS: Colon polyps, fair prep    PROCEDURE:  Colonoscopy with cold snare    SPECIMEN:        ID Type Source Tests Collected by Time Destination   A : CECAL POLYPS Polyp Large Intestine, Cecum SURGICAL PATHOLOGY EXAM Johnny Campbell MD 2/13/2020  8:36 AM    B :  Polyp Large Intestine, Left/Descending SURGICAL PATHOLOGY EXAM Johnny Campbell MD 2/13/2020  8:52 AM    C :  Polyp Large Intestine, Sigmoid SURGICAL PATHOLOGY EXAM Johnny Campbell MD 2/13/2020  8:57 AM          ANESTHESIA:  Monitor Anesthesia Care CRNA Independent: Kellerman, David, APRN CRNA   Coverage requested    ESTIMATED BLOOD LOSS: none    COMPLICATIONS:  None    INDICATION FOR THE PROCEDURE: The patient is a 54 year old male. The patient presents with need for screening. I explained to the patient the risks, benefits and alternatives to screening colonoscopy for evaluating for cancer or polyps. We discussed the risks including bleeding, perforation, potential inability to reach the cecum and the risks of sedation. The patient's questions were answered and the patient wished to proceed. Informed consent paperwork was completed.    PROCEDURE: The patient was taken to the endoscopy suite. Appropriate monitors were attached. The patient was placed in the left lateral decubitus position. Timeout was performed confirming the patient's identity and procedure to be performed.  After appropriate sedation was confirmed, digital rectal exam was performed.  There was normal tone and no gross abnormality was noted.  The lubricated colonoscope was introduced into the anus the colon was insufflated with air. The prep quality was fair. Under direct visualization the scope was advanced to the cecum. The mucosa of the colon was inspected while withdrawing the scope. A 2 mm and a 4 mm polyp were removed with cold snare from the cecum. A 5 mm descending colon  polyp was removed with cold snare and a 3 mm sigmoid polyp.  The scope was retroflexed in the rectum and the anorectal junction was inspected. Skin tags were noted. The scope was returned to aneutral position and the colon was decompressed. The scope was removed. The patient tolerated the procedure with no immediately apparent complication. The patient was taken to recovery in stable condition.    FOLLOW UP: RECOMMEND high fiber diet, will call with pathology results.     Johnny Campbell MD on 2/13/2020 at 9:06 AM

## 2020-11-16 ENCOUNTER — VIRTUAL VISIT (OUTPATIENT)
Dept: FAMILY MEDICINE | Facility: OTHER | Age: 55
End: 2020-11-16
Attending: FAMILY MEDICINE
Payer: COMMERCIAL

## 2020-11-16 VITALS — WEIGHT: 190 LBS | BODY MASS INDEX: 26.6 KG/M2 | HEIGHT: 71 IN

## 2020-11-16 DIAGNOSIS — Z20.822 ENCOUNTER FOR LABORATORY TESTING FOR COVID-19 VIRUS: Primary | ICD-10-CM

## 2020-11-16 PROCEDURE — 99212 OFFICE O/P EST SF 10 MIN: CPT | Mod: 95 | Performed by: NURSE PRACTITIONER

## 2020-11-16 ASSESSMENT — PAIN SCALES - GENERAL: PAINLEVEL: NO PAIN (0)

## 2020-11-16 ASSESSMENT — MIFFLIN-ST. JEOR: SCORE: 1728.96

## 2020-11-16 NOTE — PROGRESS NOTES
"Zachariah Stout is a 55 year old male who is being evaluated via a billable telephone visit.      The patient has been notified of following:     \"This telephone visit will be conducted via a call between you and your physician/provider. We have found that certain health care needs can be provided without the need for a physical exam.  This service lets us provide the care you need with a short phone conversation.  If a prescription is necessary we can send it directly to your pharmacy.  If lab work is needed we can place an order for that and you can then stop by our lab to have the test done at a later time.    Telephone visits are billed at different rates depending on your insurance coverage. During this emergency period, for some insurers they may be billed the same as an in-person visit.  Please reach out to your insurance provider with any questions.    If during the course of the call the physician/provider feels a telephone visit is not appropriate, you will not be charged for this service.\"    Patient has given verbal consent for Telephone visit?  Yes    What phone number would you like to be contacted at? [phone]    How would you like to obtain your AVS? MyChart    Subjective     Zachariah Stout is a 55 year old male who presents via phone visit today for the following health issues:  covid testing    HPI  States he has had no known covid exposure.  Denies any covid exposure.  States he is traveling to [place] to visit friends, one of which has asthma so he is requesting testing prior to visiting.  He has not been notified of any requirement for covid testing from the airlines.        Objective          Vitals:  No vitals were obtained today due to virtual visit.    healthy, alert, no distress and cooperative  PSYCH: Alert and oriented times 3; coherent speech, normal   rate and volume, able to articulate logical thoughts, able   to abstract reason, no tangential thoughts, no hallucinations   or delusions  " His affect is normal and pleasant  RESP: No cough, no audible wheezing, able to talk in full sentences  Remainder of exam unable to be completed due to telephone visits        Assessment & Plan     1.  Encounter for laboratory testing for COVID-19    Discussed with patient that he does not meet the current criteria for asymptomatic no known exposure testing for COVID-19.  Patient was requesting the testing as he plans to travel to visit a friend which I explained is not an indication for Covid testing at this time and patient was encouraged to reconsider his travel plans due to the high volumes of Covid.       An Olea NP  [facility]    Phone call duration:  5 minutes

## 2020-11-16 NOTE — NURSING NOTE
"Chief Complaint   Patient presents with     COVID question     Patient is leaving for [place] on Saturday to spend time with some friends who are high risk.     Initial Ht 1.803 m (5' 11\")   Wt 86.2 kg (190 lb)   BMI 26.50 kg/m   Estimated body mass index is 26.5 kg/m  as calculated from the following:    Height as of this encounter: 1.803 m (5' 11\").    Weight as of this encounter: 86.2 kg (190 lb).  Medication Reconciliation: complete    Cecy Olson LPN    "

## 2024-11-25 PROBLEM — G89.29 CHRONIC HEEL PAIN, LEFT: Status: ACTIVE | Noted: 2020-11-12

## 2024-11-25 PROBLEM — Z86.0100 HISTORY OF COLON POLYPS: Status: ACTIVE | Noted: 2020-12-30

## 2024-11-25 PROBLEM — M79.672 CHRONIC HEEL PAIN, LEFT: Status: ACTIVE | Noted: 2020-11-12

## (undated) DEVICE — ENDO SNARE EXACTO COLD 9MM LOOP 2.4MMX230CM 00711115

## (undated) DEVICE — ENDO TRAP POLYP E-TRAP 00711099

## (undated) DEVICE — TUBING SUCTION 10'X3/16" N510

## (undated) DEVICE — ENDO KIT COMPLIANCE DYKENDOCMPLY

## (undated) DEVICE — SUCTION MANIFOLD NEPTUNE 2 SYS 4 PORT 0702-020-000

## (undated) DEVICE — SOL WATER 1500ML

## (undated) DEVICE — ENDO BRUSH CHANNEL MASTER CLEANING 2-4.2MM BW-412T

## (undated) RX ORDER — PROPOFOL 10 MG/ML
INJECTION, EMULSION INTRAVENOUS
Status: DISPENSED
Start: 2020-02-13

## (undated) RX ORDER — GLYCOPYRROLATE 0.2 MG/ML
INJECTION, SOLUTION INTRAMUSCULAR; INTRAVENOUS
Status: DISPENSED
Start: 2020-02-13

## (undated) RX ORDER — LIDOCAINE HYDROCHLORIDE 20 MG/ML
INJECTION, SOLUTION EPIDURAL; INFILTRATION; INTRACAUDAL; PERINEURAL
Status: DISPENSED
Start: 2020-02-13